# Patient Record
Sex: MALE | Race: BLACK OR AFRICAN AMERICAN | Employment: OTHER | URBAN - NONMETROPOLITAN AREA
[De-identification: names, ages, dates, MRNs, and addresses within clinical notes are randomized per-mention and may not be internally consistent; named-entity substitution may affect disease eponyms.]

---

## 2017-04-11 ENCOUNTER — HOSPITAL ENCOUNTER (EMERGENCY)
Age: 22
Discharge: HOME | End: 2017-04-11
Payer: COMMERCIAL

## 2017-04-11 VITALS
RESPIRATION RATE: 20 BRPM | TEMPERATURE: 97.4 F | BODY MASS INDEX: 16.88 KG/M2 | HEIGHT: 66 IN | HEART RATE: 89 BPM | SYSTOLIC BLOOD PRESSURE: 110 MMHG | OXYGEN SATURATION: 97 % | WEIGHT: 105 LBS | DIASTOLIC BLOOD PRESSURE: 68 MMHG

## 2017-04-11 DIAGNOSIS — T14.8XXA ABRASION: Primary | ICD-10-CM

## 2017-04-11 PROCEDURE — 99282 EMERGENCY DEPT VISIT SF MDM: CPT

## 2017-04-11 PROCEDURE — 99282 EMERGENCY DEPT VISIT SF MDM: CPT | Performed by: NURSE PRACTITIONER

## 2017-04-11 RX ORDER — TRAZODONE HYDROCHLORIDE 100 MG/1
100 TABLET ORAL NIGHTLY
COMMUNITY

## 2019-09-28 ENCOUNTER — HOSPITAL ENCOUNTER (EMERGENCY)
Age: 24
Discharge: HOME | End: 2019-09-29
Payer: COMMERCIAL

## 2019-09-28 DIAGNOSIS — R10.84 GENERALIZED ABDOMINAL PAIN: ICD-10-CM

## 2019-09-28 DIAGNOSIS — K59.00 CONSTIPATION, UNSPECIFIED CONSTIPATION TYPE: Primary | ICD-10-CM

## 2019-09-28 PROCEDURE — 99284 EMERGENCY DEPT VISIT MOD MDM: CPT

## 2019-09-29 ENCOUNTER — APPOINTMENT (OUTPATIENT)
Dept: CT IMAGING | Age: 24
End: 2019-09-29
Payer: COMMERCIAL

## 2019-09-29 ENCOUNTER — APPOINTMENT (OUTPATIENT)
Dept: GENERAL RADIOLOGY | Age: 24
End: 2019-09-29
Payer: COMMERCIAL

## 2019-09-29 VITALS
SYSTOLIC BLOOD PRESSURE: 138 MMHG | DIASTOLIC BLOOD PRESSURE: 90 MMHG | WEIGHT: 105 LBS | HEART RATE: 88 BPM | BODY MASS INDEX: 16.95 KG/M2 | TEMPERATURE: 98.1 F | RESPIRATION RATE: 20 BRPM | OXYGEN SATURATION: 100 %

## 2019-09-29 LAB
ALBUMIN SERPL-MCNC: 4.1 G/DL (ref 3.5–5.2)
ALP BLD-CCNC: 57 U/L (ref 40–130)
ALT SERPL-CCNC: 42 U/L (ref 5–41)
ANION GAP SERPL CALCULATED.3IONS-SCNC: 9 MMOL/L (ref 7–19)
AST SERPL-CCNC: 58 U/L (ref 5–40)
BASOPHILS ABSOLUTE: 0 K/UL (ref 0–0.2)
BASOPHILS RELATIVE PERCENT: 0.7 % (ref 0–1)
BILIRUB SERPL-MCNC: 0.3 MG/DL (ref 0.2–1.2)
BILIRUBIN URINE: NEGATIVE
BLOOD, URINE: NEGATIVE
BUN BLDV-MCNC: 15 MG/DL (ref 6–20)
CALCIUM SERPL-MCNC: 8.9 MG/DL (ref 8.6–10)
CHLORIDE BLD-SCNC: 97 MMOL/L (ref 98–111)
CLARITY: CLEAR
CO2: 29 MMOL/L (ref 22–29)
COLOR: YELLOW
CREAT SERPL-MCNC: 0.8 MG/DL (ref 0.5–1.2)
EOSINOPHILS ABSOLUTE: 0.1 K/UL (ref 0–0.6)
EOSINOPHILS RELATIVE PERCENT: 2 % (ref 0–5)
GFR NON-AFRICAN AMERICAN: >60
GLUCOSE BLD-MCNC: 112 MG/DL (ref 74–109)
GLUCOSE URINE: NEGATIVE MG/DL
HCT VFR BLD CALC: 44.9 % (ref 42–52)
HEMOGLOBIN: 15.1 G/DL (ref 14–18)
IMMATURE GRANULOCYTES #: 0 K/UL
KETONES, URINE: NEGATIVE MG/DL
LACTIC ACID: 1.2 MMOL/L (ref 0.5–1.9)
LEUKOCYTE ESTERASE, URINE: NEGATIVE
LIPASE: 39 U/L (ref 13–60)
LYMPHOCYTES ABSOLUTE: 1.7 K/UL (ref 1.1–4.5)
LYMPHOCYTES RELATIVE PERCENT: 31.8 % (ref 20–40)
MCH RBC QN AUTO: 32.8 PG (ref 27–31)
MCHC RBC AUTO-ENTMCNC: 33.6 G/DL (ref 33–37)
MCV RBC AUTO: 97.6 FL (ref 80–94)
MONOCYTES ABSOLUTE: 0.5 K/UL (ref 0–0.9)
MONOCYTES RELATIVE PERCENT: 9.2 % (ref 0–10)
NEUTROPHILS ABSOLUTE: 3 K/UL (ref 1.5–7.5)
NEUTROPHILS RELATIVE PERCENT: 55.9 % (ref 50–65)
NITRITE, URINE: NEGATIVE
PDW BLD-RTO: 13.5 % (ref 11.5–14.5)
PH UA: 7.5 (ref 5–8)
PLATELET # BLD: 176 K/UL (ref 130–400)
PMV BLD AUTO: 9.4 FL (ref 9.4–12.4)
PROTEIN UA: NEGATIVE MG/DL
RBC # BLD: 4.6 M/UL (ref 4.7–6.1)
SODIUM BLD-SCNC: 135 MMOL/L (ref 136–145)
SPECIFIC GRAVITY UA: 1.02 (ref 1–1.03)
TOTAL PROTEIN: 8 G/DL (ref 6.6–8.7)
URINE REFLEX TO CULTURE: NORMAL
UROBILINOGEN, URINE: 1 E.U./DL
WBC # BLD: 5.4 K/UL (ref 4.8–10.8)

## 2019-09-29 PROCEDURE — 74022 RADEX COMPL AQT ABD SERIES: CPT

## 2019-09-29 PROCEDURE — 74177 CT ABD & PELVIS W/CONTRAST: CPT

## 2019-09-29 PROCEDURE — 80053 COMPREHEN METABOLIC PANEL: CPT

## 2019-09-29 PROCEDURE — 83690 ASSAY OF LIPASE: CPT

## 2019-09-29 PROCEDURE — 36415 COLL VENOUS BLD VENIPUNCTURE: CPT

## 2019-09-29 PROCEDURE — 83605 ASSAY OF LACTIC ACID: CPT

## 2019-09-29 PROCEDURE — 6370000000 HC RX 637 (ALT 250 FOR IP): Performed by: NURSE PRACTITIONER

## 2019-09-29 PROCEDURE — 81003 URINALYSIS AUTO W/O SCOPE: CPT

## 2019-09-29 PROCEDURE — 6360000004 HC RX CONTRAST MEDICATION: Performed by: NURSE PRACTITIONER

## 2019-09-29 PROCEDURE — 85025 COMPLETE CBC W/AUTO DIFF WBC: CPT

## 2019-09-29 RX ORDER — TRAZODONE HYDROCHLORIDE 50 MG/1
100 TABLET ORAL NIGHTLY
Status: DISCONTINUED | OUTPATIENT
Start: 2019-09-29 | End: 2019-09-29 | Stop reason: HOSPADM

## 2019-09-29 RX ADMIN — TRAZODONE HYDROCHLORIDE 100 MG: 50 TABLET ORAL at 01:44

## 2019-09-29 RX ADMIN — IOPAMIDOL 50 ML: 755 INJECTION, SOLUTION INTRAVENOUS at 02:25

## 2019-09-29 SDOH — HEALTH STABILITY: MENTAL HEALTH: HOW OFTEN DO YOU HAVE A DRINK CONTAINING ALCOHOL?: NEVER

## 2019-09-29 ASSESSMENT — ENCOUNTER SYMPTOMS
DIARRHEA: 0
EYE DISCHARGE: 0
EYE REDNESS: 0
WHEEZING: 0
BLOOD IN STOOL: 0
SORE THROAT: 0
APNEA: 0
ABDOMINAL DISTENTION: 0
EYE PAIN: 0
COLOR CHANGE: 0
NAUSEA: 0
STRIDOR: 0
PHOTOPHOBIA: 0
BACK PAIN: 0
ABDOMINAL PAIN: 0
CHEST TIGHTNESS: 0
SHORTNESS OF BREATH: 0
VOMITING: 0
CONSTIPATION: 1
SINUS PAIN: 0
RECTAL PAIN: 0

## 2019-09-29 NOTE — ED TRIAGE NOTES
Pt is non-verbal and blind. Pt family at bedside. Pt parents report that pt is usual calm and happy but that for the past 3 days or so pt has been increasingly fussy. Pt mom reports that pt behavior improved following giving pt pepto which indicates to pt mom that pt is having abdominal pains.

## 2019-11-18 ENCOUNTER — NURSE TRIAGE (OUTPATIENT)
Dept: CALL CENTER | Facility: HOSPITAL | Age: 24
End: 2019-11-18

## 2019-11-18 NOTE — TELEPHONE ENCOUNTER
"Caller states son is unable to communicate and crying out like he's in pain on and off. She states she has given him a enema with no results today and she is concerned. She denies any vomiting. During assessment she states he has had a tarry stool this week. She states they have talked about getting him in with a GI doctor. Advised to be seen for evaluation.     Reason for Disposition  • Patient sounds very sick or weak to the triager    Additional Information  • Negative: [1] Abdomen pain is main symptom AND [2] adult male  • Negative: [1] Abdomen pain is main symptom AND [2] adult female  • Negative: Rectal bleeding or blood in stool is main symptom  • Negative: Rectal pain or itching is main symptom  • Negative: Constipation in a cancer patient who is currently (or recently) receiving chemotherapy or radiation therapy, or cancer patient who has metastatic or end-stage cancer and is receiving palliative care  • Negative: [1] Vomiting AND [2] abdomen looks much more swollen than usual  • Negative: [1] Vomiting AND [2] contains bile (green color)    Answer Assessment - Initial Assessment Questions  1. STOOL PATTERN OR FREQUENCY: \"How often do you pass bowel movements (BMs)?\"  (Normal range: tid to q 3 days)  \"When was the last BM passed?\"        Every couple day's   2. STRAINING: \"Do you have to strain to have a BM?\"       Some crying and belching   3. RECTAL PAIN: \"Does your rectum hurt when the stool comes out?\" If so, ask: \"Do you have hemorrhoids? How bad is the pain?\"  (Scale 1-10; or mild, moderate, severe)      Denies   4. STOOL COMPOSITION: \"Are the stools hard?\"       Have been loose   5. BLOOD ON STOOLS: \"Has there been any blood on the toilet tissue or on the surface of the BM?\" If so, ask: \"When was the last time?\"       Unsure but one did look tarry   6. CHRONIC CONSTIPATION: \"Is this a new problem for you?\"  If no, ask: How long have you had this problem?\" (days, weeks, months)        Not really but " "more so recently   7. CHANGES IN DIET: \"Have there been any recent changes in your diet?\"       Denies   8. MEDICATIONS: \"Have you been taking any new medications?\"      Only takes trazodone really   9. LAXATIVES: \"Have you been using any laxatives or enemas?\"  If yes, ask \"What, how often, and when was the last time?\"      Fleet's today and suppository two day's ago   10. CAUSE: \"What do you think is causing the constipation?\"         Lazy colon now   11. OTHER SYMPTOMS: \"Do you have any other symptoms?\" (e.g., abdominal pain, fever, vomiting)        ? Abdominal pain as he's been crying   12. PREGNANCY: \"Is there any chance you are pregnant?\" \"When was your last menstrual period?\"        n/a    Protocols used: CONSTIPATION-ADULT-      "

## 2020-02-05 ENCOUNTER — NURSE TRIAGE (OUTPATIENT)
Dept: CALL CENTER | Facility: HOSPITAL | Age: 25
End: 2020-02-05

## 2020-02-05 RX ORDER — AZITHROMYCIN 250 MG/1
250 TABLET, FILM COATED ORAL TAKE AS DIRECTED
Qty: 6 TABLET | Refills: 0 | Status: SHIPPED | OUTPATIENT
Start: 2020-02-05 | End: 2020-02-06 | Stop reason: SDUPTHER

## 2020-02-05 RX ORDER — AZITHROMYCIN 250 MG/1
250 TABLET, FILM COATED ORAL TAKE AS DIRECTED
Qty: 6 TABLET | Refills: 0 | Status: SHIPPED | OUTPATIENT
Start: 2020-02-05 | End: 2020-02-05 | Stop reason: SDUPTHER

## 2020-02-05 NOTE — TELEPHONE ENCOUNTER
Pt's mother called in requesting RX for pt.   Pt is c/o sinus infection. No fever, cough, head congestion.    Offered appt, mother stated pt is handicap and she doesn't want to get him out in this weather.     Please send RX to [facility].

## 2020-02-05 NOTE — TELEPHONE ENCOUNTER
Please send for z-pack. Advise patient's mother to have him complete antibiotics and to come to the clinic and be seen if his symptoms worsen. Thank you.

## 2020-02-05 NOTE — TELEPHONE ENCOUNTER
"Told caller zpack was sent to [facility] today at 13:55  By PILI García. Caller states will call pharmacy and hung up.     Reason for Disposition  • [1] Prescription not at pharmacy AND [2] was prescribed today by PCP    Additional Information  • Negative: Drug overdose and nurse unable to answer question  • Negative: Caller requesting information not related to medicine  • Negative: Caller requesting a prescription for Strep throat and has a positive culture result  • Negative: Rash while taking a medication or within 3 days of stopping it  • Negative: Immunization reaction suspected  • Negative: [1] Asthma and [2] having symptoms of asthma (cough, wheezing, etc)  • Negative: MORE THAN A DOUBLE DOSE of a prescription or over-the-counter (OTC) drug  • Negative: [1] DOUBLE DOSE (an extra dose or lesser amount) of over-the-counter (OTC) drug AND [2] any symptoms (e.g., dizziness, nausea, pain, sleepiness)  • Negative: [1] DOUBLE DOSE (an extra dose or lesser amount) of prescription drug AND [2] any symptoms (e.g., dizziness, nausea, pain, sleepiness)  • Negative: Took another person's prescription drug  • Negative: [1] DOUBLE DOSE (an extra dose or lesser amount) of prescription drug AND [2] NO symptoms (Exception: a double dose of antibiotics)  • Negative: Diabetes drug error or overdose (e.g., insulin or extra dose)  • Negative: [1] Request for URGENT new prescription or refill of \"essential\" medication (i.e., likelihood of harm to patient if not taken) AND [2] triager unable to fill per unit policy    Answer Assessment - Initial Assessment Questions  1. SYMPTOMS: \"Do you have any symptoms?\"      Has cough and cold  2. SEVERITY: If symptoms are present, ask \"Are they mild, moderate or severe?\"      Mild to moderate    Protocols used: MEDICATION QUESTION CALL-ADULT-      "

## 2020-02-06 RX ORDER — AZITHROMYCIN 250 MG/1
250 TABLET, FILM COATED ORAL TAKE AS DIRECTED
Qty: 6 TABLET | Refills: 0 | Status: SHIPPED | OUTPATIENT
Start: 2020-02-06 | End: 2020-02-06 | Stop reason: SDUPTHER

## 2020-02-06 RX ORDER — AZITHROMYCIN 250 MG/1
250 TABLET, FILM COATED ORAL TAKE AS DIRECTED
Qty: 6 TABLET | Refills: 0 | Status: SHIPPED | OUTPATIENT
Start: 2020-02-06 | End: 2021-03-11

## 2020-03-04 ENCOUNTER — OFFICE VISIT (OUTPATIENT)
Dept: INTERNAL MEDICINE | Facility: CLINIC | Age: 25
End: 2020-03-04

## 2020-03-04 VITALS
WEIGHT: 135 LBS | BODY MASS INDEX: 22.49 KG/M2 | DIASTOLIC BLOOD PRESSURE: 86 MMHG | HEIGHT: 65 IN | SYSTOLIC BLOOD PRESSURE: 128 MMHG | HEART RATE: 84 BPM

## 2020-03-04 DIAGNOSIS — Z00.00 ANNUAL PHYSICAL EXAM: Primary | ICD-10-CM

## 2020-03-04 DIAGNOSIS — Q90.9 DOWN SYNDROME: ICD-10-CM

## 2020-03-04 DIAGNOSIS — H54.8 LEGALLY BLIND: ICD-10-CM

## 2020-03-04 DIAGNOSIS — K59.09 CHRONIC CONSTIPATION: ICD-10-CM

## 2020-03-04 DIAGNOSIS — G47.01 INSOMNIA DUE TO MEDICAL CONDITION: ICD-10-CM

## 2020-03-04 PROBLEM — I38: Status: ACTIVE | Noted: 2020-03-04

## 2020-03-04 PROBLEM — L21.9 SEBORRHEIC DERMATITIS OF SCALP: Status: ACTIVE | Noted: 2020-03-04

## 2020-03-04 PROBLEM — R19.7 DIARRHEA: Status: ACTIVE | Noted: 2020-03-04

## 2020-03-04 PROBLEM — M41.119 JUVENILE IDIOPATHIC SCOLIOSIS: Status: ACTIVE | Noted: 2020-03-04

## 2020-03-04 PROBLEM — H33.23 DETACHED RETINA, BILATERAL: Status: ACTIVE | Noted: 2020-03-04

## 2020-03-04 PROBLEM — R32 URINARY AND BOWEL INCONTINENCE: Status: ACTIVE | Noted: 2020-03-04

## 2020-03-04 PROBLEM — Q21.23 ATRIOVENTRICULAR SEPTAL DEFECT (AVSD), COMPLETE: Status: ACTIVE | Noted: 2020-03-04

## 2020-03-04 PROBLEM — Q24.9 ADULT CONGENITAL HEART DISEASE: Status: ACTIVE | Noted: 2020-03-04

## 2020-03-04 PROBLEM — Q25.0 PATENT DUCTUS ARTERIOSUS: Status: ACTIVE | Noted: 2020-03-04

## 2020-03-04 PROBLEM — F51.04 CHRONIC INSOMNIA: Status: ACTIVE | Noted: 2020-03-04

## 2020-03-04 PROBLEM — R15.9 URINARY AND BOWEL INCONTINENCE: Status: ACTIVE | Noted: 2020-03-04

## 2020-03-04 PROBLEM — Z79.899 ENCOUNTER FOR LONG-TERM (CURRENT) DRUG USE: Status: ACTIVE | Noted: 2020-03-04

## 2020-03-04 PROBLEM — R01.1 CARDIAC MURMUR: Status: ACTIVE | Noted: 2020-03-04

## 2020-03-04 PROCEDURE — 99214 OFFICE O/P EST MOD 30 MIN: CPT | Performed by: NURSE PRACTITIONER

## 2020-03-04 RX ORDER — LINACLOTIDE 72 UG/1
72 CAPSULE, GELATIN COATED ORAL
Qty: 30 CAPSULE | Refills: 5 | Status: SHIPPED | OUTPATIENT
Start: 2020-03-04 | End: 2020-11-02 | Stop reason: SDUPTHER

## 2020-03-04 RX ORDER — KETOCONAZOLE 20 MG/ML
1 SHAMPOO TOPICAL 2 TIMES DAILY
COMMUNITY
Start: 2018-08-21 | End: 2021-03-11

## 2020-03-04 RX ORDER — LINACLOTIDE 72 UG/1
1 CAPSULE, GELATIN COATED ORAL
COMMUNITY
Start: 2020-02-22 | End: 2020-03-04 | Stop reason: SDUPTHER

## 2020-03-04 RX ORDER — PREDNISOLONE ACETATE 10 MG/ML
2 SUSPENSION/ DROPS OPHTHALMIC 3 TIMES WEEKLY
COMMUNITY
Start: 2017-06-05 | End: 2021-03-11

## 2020-03-04 RX ORDER — TRAZODONE HYDROCHLORIDE 100 MG/1
1.5 TABLET ORAL NIGHTLY PRN
COMMUNITY
Start: 2020-01-07 | End: 2020-03-04 | Stop reason: SDUPTHER

## 2020-03-04 RX ORDER — TRAZODONE HYDROCHLORIDE 100 MG/1
200 TABLET ORAL NIGHTLY
Qty: 60 TABLET | Refills: 6 | Status: SHIPPED | OUTPATIENT
Start: 2020-03-04 | End: 2020-04-24 | Stop reason: SDUPTHER

## 2020-03-04 NOTE — PROGRESS NOTES
Subjective   Kwame Dc is a 24 y.o. male.   No chief complaint on file.      Patient is here today for  yearly exam and lab review.       The following portions of the patient's history were reviewed and updated as appropriate: allergies, current medications, past family history, past medical history, past social history, past surgical history and problem list.    Review of Systems   Constitutional: Negative for activity change, appetite change, fatigue, fever, unexpected weight gain and unexpected weight loss.   HENT: Negative for swollen glands, trouble swallowing and voice change.    Eyes: Negative for blurred vision and visual disturbance.   Respiratory: Negative for cough and shortness of breath.    Cardiovascular: Negative for chest pain, palpitations and leg swelling.   Gastrointestinal: Negative for abdominal pain, constipation, diarrhea, nausea, vomiting and indigestion.   Endocrine: Negative for cold intolerance, heat intolerance, polydipsia and polyphagia.   Genitourinary: Negative for dysuria and frequency.   Musculoskeletal: Negative for arthralgias, back pain, joint swelling and neck pain.   Skin: Negative for color change, rash and skin lesions.   Neurological: Negative for dizziness, weakness, headache, memory problem and confusion.   Hematological: Does not bruise/bleed easily.   Psychiatric/Behavioral: Negative for agitation, hallucinations and suicidal ideas. The patient is not nervous/anxious.        Objective   Past Medical History:   Diagnosis Date   • Allergic    • Blind    • Down syndrome    • Heart murmur    • Urinary tract infection    • Visual impairment       Past Surgical History:   Procedure Laterality Date   • RETINAL DETACHMENT REPAIR Bilateral      multiple surgeries by Dr. Coelho        Current Outpatient Medications:   •  ketoconazole (NIZORAL) 2 % shampoo, Apply 1 application topically to the appropriate area as directed 2 (Two) Times a Day., Disp: , Rfl:   •  LINZESS 72 MCG  capsule capsule, Take 1 capsule by mouth Daily With Breakfast., Disp: 30 capsule, Rfl: 5  •  prednisoLONE acetate (PRED FORTE) 1 % ophthalmic suspension, Apply 2 drops to eye(s) as directed by provider 3 (Three) Times a Week., Disp: , Rfl:   •  traZODone (DESYREL) 100 MG tablet, Take 2 tablets by mouth Every Night., Disp: 60 tablet, Rfl: 6  •  azithromycin (ZITHROMAX Z-MIRYAM) 250 MG tablet, Take 1 tablet by mouth Take As Directed. Take 2 tablets the first day, then 1 tablet daily for 4 days., Disp: 6 tablet, Rfl: 0     Vitals:    03/04/20 0839   BP: 128/86   Pulse: 84         03/04/20  0839   Weight: 61.2 kg (135 lb)     Patient's Body mass index is 22.47 kg/m². BMI is within normal parameters. No follow-up required..      Physical Exam   Constitutional: He is oriented to person, place, and time. He appears well-developed and well-nourished.   HENT:   Head: Normocephalic and atraumatic.   Right Ear: External ear normal. cerumen impaction is present.  Left Ear: External ear normal. An impacted cerumen is present.  Mouth/Throat: Oropharynx is clear and moist.   Eyes: Pupils are equal, round, and reactive to light. Conjunctivae and EOM are normal.   Neck: Normal range of motion. Neck supple. No thyromegaly present.   Cardiovascular: Normal rate, regular rhythm, normal heart sounds and intact distal pulses.   Pulmonary/Chest: Effort normal and breath sounds normal.   Abdominal: Soft. Bowel sounds are normal.   Lymphadenopathy:     He has no cervical adenopathy.   Neurological: He is alert and oriented to person, place, and time.   Skin: Skin is warm and dry.   Psychiatric: He has a normal mood and affect. His behavior is normal. Thought content normal.   Nursing note and vitals reviewed.            Assessment/Plan   Diagnoses and all orders for this visit:    1. Annual physical exam (Primary)  -     CBC & Differential; Future  -     Comprehensive Metabolic Panel; Future  -     Lipid Panel; Future  -     TSH; Future  -      Uric Acid; Future  -     Hepatitis C Antibody; Future    2. Insomnia due to medical condition  -     traZODone (DESYREL) 100 MG tablet; Take 2 tablets by mouth Every Night.  Dispense: 60 tablet; Refill: 6    3. Chronic constipation  -     LINZESS 72 MCG capsule capsule; Take 1 capsule by mouth Daily With Breakfast.  Dispense: 30 capsule; Refill: 5    4. Down syndrome    5. Legally blind      Patient upset today related to PTSD after multiple surgeries for spontaneous retinal detachments and physical exam was limited to his participation. Patient will had labs completed next week.  Patient is non-verbal and legally blind and mother is full-time caregiver. Patient is currently using the Linzess to prevent constipation, and states that he has not had issues over the last few months. Will increase trazodone to 2 tablets at night, patients parents report he is not staying asleep more than 4-5 hours. Advised patient on the use of Sweet Oil for bilateral ear,  wax impactions, patient would not tolerate ear irrigation.

## 2020-03-11 ENCOUNTER — RESULTS ENCOUNTER (OUTPATIENT)
Dept: INTERNAL MEDICINE | Facility: CLINIC | Age: 25
End: 2020-03-11

## 2020-03-11 DIAGNOSIS — Z00.00 ANNUAL PHYSICAL EXAM: ICD-10-CM

## 2020-03-13 ENCOUNTER — TELEPHONE (OUTPATIENT)
Dept: INTERNAL MEDICINE | Facility: CLINIC | Age: 25
End: 2020-03-13

## 2020-03-13 NOTE — TELEPHONE ENCOUNTER
Pts mother called in stating that Pt needs separate scripts for depends, surgical gloves, disposable bed pads and wipes. States that Pad office sent over paper work for Map 10 and it needs the correct date put on it.    Pt Contact: Mother Mariaelena [phone]

## 2020-03-16 NOTE — TELEPHONE ENCOUNTER
PT's mother, Mariaelena, called to check the status of requested prescriptions. States scripts must be separate on the following items:  · Surgical gloves (size: XL)  · Depends (size: L)  · Disposable bed pads  · Wipes    Scripts, along with completed MAP10 form, should be faxed to PT's , Юлия, at the following provided fax number:   [phone]  ATTN: ЮЛИЯ Ferrell is requesting an update via phone: [phone]

## 2020-03-16 NOTE — TELEPHONE ENCOUNTER
I called and spoke with patient mother. Let her know we have not received this form. She will have  refax. And we will send orders for supplies asap.

## 2020-03-16 NOTE — TELEPHONE ENCOUNTER
I have not seen any paperwork for this patient.  The only thing I know to do is to write it out on a green Rx pad and have Dr. Wade sign.

## 2020-03-17 ENCOUNTER — TELEPHONE (OUTPATIENT)
Dept: INTERNAL MEDICINE | Facility: CLINIC | Age: 25
End: 2020-03-17

## 2020-03-17 NOTE — TELEPHONE ENCOUNTER
Orestes with [facility] is calling needing a Map 10 Form for the patient.  He states he received the script for the incontinence  supplies but not the Map 10.  Please fax at [phone]

## 2020-04-24 DIAGNOSIS — G47.01 INSOMNIA DUE TO MEDICAL CONDITION: ICD-10-CM

## 2020-04-24 RX ORDER — TRAZODONE HYDROCHLORIDE 100 MG/1
200 TABLET ORAL NIGHTLY
Qty: 180 TABLET | Refills: 3 | Status: SHIPPED | OUTPATIENT
Start: 2020-04-24 | End: 2021-03-29 | Stop reason: SDUPTHER

## 2020-11-02 DIAGNOSIS — K59.09 CHRONIC CONSTIPATION: ICD-10-CM

## 2020-11-02 NOTE — TELEPHONE ENCOUNTER
Caller: MELVIN NGUYEN    Relationship: Mother    Best call back number:[phone]    Medication needed:   Requested Prescriptions     Pending Prescriptions Disp Refills   • Linzess 72 MCG capsule capsule 30 capsule 5     Sig: Take 1 capsule by mouth Daily With Breakfast.       What is the patient's preferred pharmacy: [facility]/PHARMACY #[id number] - [place], [place] - [address] AT [address]CROSS FROM [address]  [phone] [facility] [phone]

## 2020-11-03 RX ORDER — LINACLOTIDE 72 UG/1
72 CAPSULE, GELATIN COATED ORAL
Qty: 30 CAPSULE | Refills: 5 | Status: SHIPPED | OUTPATIENT
Start: 2020-11-03 | End: 2021-05-06

## 2021-03-11 ENCOUNTER — OFFICE VISIT (OUTPATIENT)
Dept: INTERNAL MEDICINE | Facility: CLINIC | Age: 26
End: 2021-03-11

## 2021-03-11 VITALS
BODY MASS INDEX: 16.64 KG/M2 | OXYGEN SATURATION: 96 % | TEMPERATURE: 97.3 F | HEART RATE: 69 BPM | RESPIRATION RATE: 16 BRPM | WEIGHT: 100 LBS

## 2021-03-11 DIAGNOSIS — Z00.00 WELLNESS EXAMINATION: Primary | ICD-10-CM

## 2021-03-11 DIAGNOSIS — M41.115 JUVENILE IDIOPATHIC SCOLIOSIS OF THORACOLUMBAR REGION: ICD-10-CM

## 2021-03-11 DIAGNOSIS — R15.9 URINARY AND BOWEL INCONTINENCE: ICD-10-CM

## 2021-03-11 DIAGNOSIS — R32 URINARY AND BOWEL INCONTINENCE: ICD-10-CM

## 2021-03-11 DIAGNOSIS — Q24.9 ADULT CONGENITAL HEART DISEASE: ICD-10-CM

## 2021-03-11 DIAGNOSIS — H54.8 LEGALLY BLIND: ICD-10-CM

## 2021-03-11 DIAGNOSIS — Z11.59 NEED FOR HEPATITIS C SCREENING TEST: ICD-10-CM

## 2021-03-11 DIAGNOSIS — Q90.9 DOWN SYNDROME: ICD-10-CM

## 2021-03-11 PROCEDURE — 99395 PREV VISIT EST AGE 18-39: CPT | Performed by: INTERNAL MEDICINE

## 2021-03-11 RX ORDER — PREDNISOLONE ACETATE 10 MG/ML
1 SUSPENSION/ DROPS OPHTHALMIC 4 TIMES DAILY
COMMUNITY

## 2021-03-11 NOTE — PROGRESS NOTES
Chief Complaint   Patient presents with   • Annual Exam         History:  Kwame Dc is a 25 y.o. male who presents today for evaluation of the above problems.          ROS:  Review of Systems   Unable to perform ROS: Patient nonverbal       Allergies   Allergen Reactions   • Bacitracin Hives   • Penicillin G Hives     Past Medical History:   Diagnosis Date   • Allergic    • Blind    • Down syndrome    • Heart murmur    • Urinary tract infection    • Visual impairment      Past Surgical History:   Procedure Laterality Date   • RETINAL DETACHMENT REPAIR Bilateral      multiple surgeries by Dr. Coelho     Family History   Problem Relation Age of Onset   • No Known Problems Mother    • No Known Problems Father    • No Known Problems Maternal Grandmother    • No Known Problems Maternal Grandfather    • No Known Problems Paternal Grandmother    • No Known Problems Paternal Grandfather      The patient  reports that he has never smoked. He has never used smokeless tobacco. He reports that he does not drink alcohol and does not use drugs.  Immunization History   Administered Date(s) Administered   • Flu Vaccine Intradermal Quad 18-64YR 09/21/2020          Current Outpatient Medications:   •  Linzess 72 MCG capsule capsule, Take 1 capsule by mouth Daily With Breakfast., Disp: 30 capsule, Rfl: 5  •  prednisoLONE acetate (PRED FORTE) 1 % ophthalmic suspension, 1 drop 4 (Four) Times a Day., Disp: , Rfl:   •  traZODone (DESYREL) 100 MG tablet, Take 2 tablets by mouth Every Night., Disp: 180 tablet, Rfl: 3    OBJECTIVE:  Pulse 69   Temp 97.3 °F (36.3 °C)   Resp 16   Wt 45.4 kg (100 lb)   SpO2 96%   BMI 16.64 kg/m²    Physical Exam  Constitutional:       Comments: Patient is nonverbal he spent most of the examination time slapping himself in the head crying out.  Patient appeared not to have any discomfort or pain just a behavioral disorder associated with blindness Down syndrome.   HENT:      Right Ear: There is impacted  cerumen.      Left Ear: There is impacted cerumen.      Nose: Nose normal.   Cardiovascular:      Rate and Rhythm: Normal rate and regular rhythm.   Abdominal:      Palpations: Abdomen is soft.   Musculoskeletal:      Cervical back: Neck supple.   Skin:     Comments: Multiple areas of nodularity on his posterior scalp upper neck.  Apparently these occurred after a shaving few years ago.  These appear to be excessive scar tissue   Neurological:      Comments: Patient cried out throughout the examination he is moving both upper extremities slapping himself in the head.  This is apparently common behavior per his mother         The patient was counseled regarding healthy weight, immunizations and screenings.  Health Maintenance:        Assessment/Plan     Diagnoses and all orders for this visit:    1. Wellness examination (Primary)  -     CBC & Differential  -     Comprehensive Metabolic Panel  -     Urinalysis With Microscopic - Urine, Clean Catch  -     TSH  -     Lipid Panel  -     Uric Acid    2. Need for hepatitis C screening test  -     Hepatitis C Antibody    3. Adult congenital heart disease    4. Urinary and bowel incontinence    5. Juvenile idiopathic scoliosis of thoracolumbar region    6. Legally blind    7. Down syndrome      Patient is to have lab work today unable to get a good blood pressure on him due to flailing of his arms.  This patient is listed as having Down syndrome certainly would appear that he has some degree of autism which apparently he has a brother with autism and that would certainly fit the behavioral abnormality which is not typical of Down syndrome.  This was a very difficult examination due to his constant screaming and yelling and flailing of his arms.  I did a limited heart exam but is very difficult I think he does have a 1/6 to 2/6 systolic murmur however I did not list that and his physical exam is once again this was quite difficult.     An After Visit Summary was printed and  given to the patient at discharge.  Return in about 6 months (around 9/11/2021), or with NP, for Recheck.         Terry Wade MD  3/11/2021   Electronically signed.

## 2021-03-12 LAB
ALBUMIN SERPL-MCNC: 3.9 G/DL (ref 3.5–5.2)
ALBUMIN/GLOB SERPL: 1.1 G/DL
ALP SERPL-CCNC: 71 U/L (ref 39–117)
ALT SERPL-CCNC: 19 U/L (ref 1–41)
AST SERPL-CCNC: 23 U/L (ref 1–40)
BASOPHILS # BLD AUTO: 0.04 10*3/MM3 (ref 0–0.2)
BASOPHILS NFR BLD AUTO: 0.5 % (ref 0–1.5)
BILIRUB SERPL-MCNC: 0.2 MG/DL (ref 0–1.2)
BUN SERPL-MCNC: 13 MG/DL (ref 6–20)
BUN/CREAT SERPL: 16.9 (ref 7–25)
CALCIUM SERPL-MCNC: 8.8 MG/DL (ref 8.6–10.5)
CHLORIDE SERPL-SCNC: 97 MMOL/L (ref 98–107)
CHOLEST SERPL-MCNC: 155 MG/DL (ref 0–200)
CO2 SERPL-SCNC: 31.6 MMOL/L (ref 22–29)
CREAT SERPL-MCNC: 0.77 MG/DL (ref 0.76–1.27)
EOSINOPHIL # BLD AUTO: 0.17 10*3/MM3 (ref 0–0.4)
EOSINOPHIL NFR BLD AUTO: 2.3 % (ref 0.3–6.2)
ERYTHROCYTE [DISTWIDTH] IN BLOOD BY AUTOMATED COUNT: 13.4 % (ref 12.3–15.4)
GLOBULIN SER CALC-MCNC: 3.5 GM/DL
GLUCOSE SERPL-MCNC: 54 MG/DL (ref 65–99)
HCT VFR BLD AUTO: 44.9 % (ref 37.5–51)
HCV AB S/CO SERPL IA: 0.4 S/CO RATIO (ref 0–0.9)
HDLC SERPL-MCNC: 31 MG/DL (ref 40–60)
HGB BLD-MCNC: 15.7 G/DL (ref 13–17.7)
IMM GRANULOCYTES # BLD AUTO: 0.03 10*3/MM3 (ref 0–0.05)
IMM GRANULOCYTES NFR BLD AUTO: 0.4 % (ref 0–0.5)
LDLC SERPL CALC-MCNC: 90 MG/DL (ref 0–100)
LYMPHOCYTES # BLD AUTO: 1.28 10*3/MM3 (ref 0.7–3.1)
LYMPHOCYTES NFR BLD AUTO: 17.5 % (ref 19.6–45.3)
MCH RBC QN AUTO: 34.3 PG (ref 26.6–33)
MCHC RBC AUTO-ENTMCNC: 35 G/DL (ref 31.5–35.7)
MCV RBC AUTO: 98 FL (ref 79–97)
MONOCYTES # BLD AUTO: 0.46 10*3/MM3 (ref 0.1–0.9)
MONOCYTES NFR BLD AUTO: 6.3 % (ref 5–12)
NEUTROPHILS # BLD AUTO: 5.32 10*3/MM3 (ref 1.7–7)
NEUTROPHILS NFR BLD AUTO: 73 % (ref 42.7–76)
NRBC BLD AUTO-RTO: 0 /100 WBC (ref 0–0.2)
PLATELET # BLD AUTO: 193 10*3/MM3 (ref 140–450)
POTASSIUM SERPL-SCNC: 3.8 MMOL/L (ref 3.5–5.2)
PROT SERPL-MCNC: 7.4 G/DL (ref 6–8.5)
RBC # BLD AUTO: 4.58 10*6/MM3 (ref 4.14–5.8)
SODIUM SERPL-SCNC: 137 MMOL/L (ref 136–145)
TRIGL SERPL-MCNC: 196 MG/DL (ref 0–150)
TSH SERPL DL<=0.005 MIU/L-ACNC: 2.64 UIU/ML (ref 0.27–4.2)
URATE SERPL-MCNC: 5.3 MG/DL (ref 3.4–7)
VLDLC SERPL CALC-MCNC: 34 MG/DL (ref 5–40)
WBC # BLD AUTO: 7.3 10*3/MM3 (ref 3.4–10.8)

## 2021-03-16 ENCOUNTER — TELEPHONE (OUTPATIENT)
Dept: INTERNAL MEDICINE | Facility: CLINIC | Age: 26
End: 2021-03-16

## 2021-03-16 NOTE — TELEPHONE ENCOUNTER
----- Message from Terry Wade MD sent at 3/15/2021  7:53 AM CDT -----  Results were normal with the exception of hypoglycemia.  Make sure he is getting adequate protein in his diet.  This can be worked up however probably the best thing to do is just to recheck his sugar with a random sugar at patient's convenience if it is persistently low he will need imaging of his abdomen however due to his current condition he would likely require anesthesia to sedate him for any kind of imaging.   Clinic Care Coordination Contact  [facility]/Voicemail       Clinical Data: Care Coordinator Outreach  Outreach attempted .  Left message on voicemail with call back information and requested return call.  Plan:  Care Coordinator will try to reach patient again next month if no return call from patient.

## 2021-03-16 NOTE — TELEPHONE ENCOUNTER
03/11/2021 labs    Spoke with pt mother/guardian gave results/instructions, she voiced understanding

## 2021-03-29 DIAGNOSIS — G47.01 INSOMNIA DUE TO MEDICAL CONDITION: ICD-10-CM

## 2021-03-29 RX ORDER — TRAZODONE HYDROCHLORIDE 100 MG/1
200 TABLET ORAL NIGHTLY
Qty: 180 TABLET | Refills: 3 | Status: SHIPPED | OUTPATIENT
Start: 2021-03-29 | End: 2021-05-27 | Stop reason: SDUPTHER

## 2021-03-29 NOTE — TELEPHONE ENCOUNTER
Caller: Mariaelena Dc    Relationship: Guardian    Best call back number:[phone]    Medication needed:   Requested Prescriptions     Pending Prescriptions Disp Refills   • traZODone (DESYREL) 100 MG tablet 180 tablet 3     Sig: Take 2 tablets by mouth Every Night.       When do you need the refill by:3/29/21    What additional details did the patient provide when requesting the medication:     Does the patient have less than a 3 day supply:  [x] Yes  [] No    What is the patient's preferred pharmacy: [facility]/PHARMACY #[id number] - [place] - [address] AT [address]CROSS FROM [address]  [phone] [facility] [phone] FX

## 2021-05-05 DIAGNOSIS — K59.09 CHRONIC CONSTIPATION: ICD-10-CM

## 2021-05-06 RX ORDER — LINACLOTIDE 72 UG/1
72 CAPSULE, GELATIN COATED ORAL
Qty: 30 CAPSULE | Refills: 5 | Status: SHIPPED | OUTPATIENT
Start: 2021-05-06

## 2021-05-27 ENCOUNTER — TELEPHONE (OUTPATIENT)
Dept: INTERNAL MEDICINE | Facility: CLINIC | Age: 26
End: 2021-05-27

## 2021-05-27 DIAGNOSIS — G47.01 INSOMNIA DUE TO MEDICAL CONDITION: ICD-10-CM

## 2021-05-27 DIAGNOSIS — Q90.9 DOWN SYNDROME: Primary | ICD-10-CM

## 2021-05-27 RX ORDER — TRAZODONE HYDROCHLORIDE 100 MG/1
300 TABLET ORAL NIGHTLY
Qty: 270 TABLET | Refills: 3 | Status: SHIPPED | OUTPATIENT
Start: 2021-05-27 | End: 2022-03-16 | Stop reason: SDUPTHER

## 2021-05-27 RX ORDER — TRAZODONE HYDROCHLORIDE 100 MG/1
200 TABLET ORAL NIGHTLY
Qty: 180 TABLET | Refills: 3 | Status: CANCELLED | OUTPATIENT
Start: 2021-05-27

## 2021-05-27 NOTE — TELEPHONE ENCOUNTER
PATIENTS MOTHER CALLED IN REQUESTING WE REFAX THE PRESCRIPTION FOR HIS SUPPLIES   THE PAD OFFICE IS TELLING MOTHER THAT IS WAS NOT DATED AND THEY NEED A  NEW PRESCRIPTION SENT    PLEASE FAX TO   [phone]      GOOD CALL BACK   [phone]

## 2021-05-27 NOTE — TELEPHONE ENCOUNTER
Caller: Kwame Dc    Relationship: Self    Best call back number: [phone]  Medication needed:   Requested Prescriptions     Pending Prescriptions Disp Refills   • traZODone (DESYREL) 100 MG tablet 180 tablet 3     Sig: Take 2 tablets by mouth Every Night.       When do you need the refill by: ASAP    What additional details did the patient provide when requesting the medication: IS ON 3 TABLETS NIGHTLY NEEDING A SCRIPT TO BE CHANGED TO THAT IF POSSIBLE STATES DOCTOR VERBAL SAID IT WAS OKAY BUT DID NOT CHANGE THE SCRIPT   Does the patient have less than a 3 day supply:  [x] Yes  [] No    What is the patient's preferred pharmacy: [facility]/PHARMACY #[id number] - [place], [place] - [address] AT [address]OSS FROM [address]  [phone] [facility] [phone] FX

## 2021-05-27 NOTE — TELEPHONE ENCOUNTER
Okay for them to continue as previously taken.  Some of his been taking 300 mg at night they can continue that

## 2021-05-27 NOTE — TELEPHONE ENCOUNTER
Mother called asking for a refill of traZODone (DESYREL) 100 MG tablet. We have it listed in chart as 2 nightly , but mother is stating he was changed to 3, I could not find any documentation of this.   Please advise

## 2021-09-17 ENCOUNTER — TELEPHONE (OUTPATIENT)
Dept: INTERNAL MEDICINE | Facility: CLINIC | Age: 26
End: 2021-09-17

## 2021-09-17 NOTE — TELEPHONE ENCOUNTER
Pt mother did not want to pay $60 for a phone visit to get a covid test.  His sister went to the pharmacy so they will also

## 2021-09-17 NOTE — TELEPHONE ENCOUNTER
Caller: Mariaelena Dc    Relationship to patient: Mother    Best call back number: [phone]      Patient is needing: Mariaelena states that they were exposed to covid on approx 9/15/21. She is asking for a test to be ordered.      Symptoms include: Fever, chills, cough

## 2021-09-20 ENCOUNTER — OFFICE VISIT (OUTPATIENT)
Dept: INTERNAL MEDICINE | Facility: CLINIC | Age: 26
End: 2021-09-20

## 2021-09-20 ENCOUNTER — CLINICAL SUPPORT (OUTPATIENT)
Dept: INTERNAL MEDICINE | Facility: CLINIC | Age: 26
End: 2021-09-20

## 2021-09-20 DIAGNOSIS — R05.9 COUGH: ICD-10-CM

## 2021-09-20 DIAGNOSIS — Z20.822 EXPOSURE TO COVID-19 VIRUS: Primary | ICD-10-CM

## 2021-09-20 DIAGNOSIS — J06.9 ACUTE URI: Primary | ICD-10-CM

## 2021-09-20 PROCEDURE — 99211 OFF/OP EST MAY X REQ PHY/QHP: CPT | Performed by: NURSE PRACTITIONER

## 2021-09-20 PROCEDURE — 99442 PR PHYS/QHP TELEPHONE EVALUATION 11-20 MIN: CPT | Performed by: NURSE PRACTITIONER

## 2021-09-20 NOTE — PROGRESS NOTES
Kwame Dc  1995  [id number]    Verified patient's NAME and  before test was performed.       COVID-19 Test Performed:   Oropharyngeal Swab     Additional Tests Performed:   N/A    Location:  PATIENT VEHICLE - [facility]    How did the patient tolerate the test?   Poorly tolerated by patient., Physician notified.    Staff Member Performing Test:  Jhoana Senior    PPE Worn:    mask, gloves, eye protection, face shield and gown          Patient presented for COVID-19 testing as ordered by the provider. Appropriate PPE donned. Patient tolerated well. Patient was given COVID-19 Fact Sheet, How to Quarantine at Home Instructions, and Infection Prevention in the Home handout. Patient advised that results are expected within 2-4 days depending on the time of test.     While waiting for results of test, patient was asked to please call their primary care physician's office or the [place] hotline at [phone] for support of non-emergent symptoms expected with this virus such as increased shortness of breath, fever, cough, or other questions.    He was advised to seek care in the Emergency Department should extreme symptoms arise such as new onset confusion, extreme lethargy, hypoxia, or new hypotension.     Questions were engaged and answered to the best of my ability, patient expressed verbal understanding.

## 2021-09-20 NOTE — PROGRESS NOTES
Subjective   Kwame Dc is a 26 y.o. male.   Chief Complaint   Patient presents with   • Exposure To Known Illness     Nephew COVID Positive, symptoms x 3 days, dry cough and headache       You have chosen to receive care through a telephone visit. Do you consent to use a telephone visit for your medical care today? Yes    Kwame his mother and father present via telephone visit today on behalf of of the patient.  They would like to discuss exposure to COVID-19 and the development of symptoms for the patient.  They report he started coughing over the weekend about 3 or 4 days ago.  He has been hitting his head more as though his head is hurting.  He does not appear to be in any distress or having shortness of breath, wheezing or chills.  There has been no fever.  He has been vaccinated against COVID-19.  There has been exposure to a Covid positive case.       The following portions of the patient's history were reviewed and updated as appropriate: allergies, current medications, past family history, past medical history, past social history, past surgical history and problem list.    Review of Systems   Constitutional: Negative for activity change, appetite change, fatigue, fever, unexpected weight gain and unexpected weight loss.   HENT: Negative for swollen glands, trouble swallowing and voice change.    Eyes: Negative for blurred vision and visual disturbance.   Respiratory: Positive for cough. Negative for shortness of breath.    Cardiovascular: Negative for chest pain, palpitations and leg swelling.   Gastrointestinal: Negative for abdominal pain, constipation, diarrhea, nausea, vomiting and indigestion.   Endocrine: Negative for cold intolerance, heat intolerance, polydipsia and polyphagia.   Genitourinary: Negative for dysuria and frequency.   Musculoskeletal: Negative for arthralgias, back pain, joint swelling and neck pain.   Skin: Negative for color change, rash and skin lesions.   Neurological: Positive  for headache. Negative for dizziness, weakness, memory problem and confusion.   Hematological: Does not bruise/bleed easily.   Psychiatric/Behavioral: Negative for agitation, hallucinations and suicidal ideas. The patient is not nervous/anxious.        Objective   Past Medical History:   Diagnosis Date   • Allergic    • Blind    • Down syndrome    • Heart murmur    • Urinary tract infection    • Visual impairment       Past Surgical History:   Procedure Laterality Date   • RETINAL DETACHMENT REPAIR Bilateral      multiple surgeries by Dr. Coelho        Current Outpatient Medications:   •  Linzess 72 MCG capsule capsule, TAKE 1 CAPSULE BY MOUTH DAILY WITH BREAKFAST., Disp: 30 capsule, Rfl: 5  •  prednisoLONE acetate (PRED FORTE) 1 % ophthalmic suspension, 1 drop 4 (Four) Times a Day., Disp: , Rfl:   •  traZODone (DESYREL) 100 MG tablet, Take 3 tablets by mouth Every Night., Disp: 270 tablet, Rfl: 3      There were no vitals filed for this visit.  There were no vitals filed for this visit.    There is no height or weight on file to calculate BMI.    Physical Exam    No physical exam due to telephone visit      Assessment/Plan   Diagnoses and all orders for this visit:    1. Exposure to COVID-19 virus (Primary)    2. Cough      I have advised at this time to come for COVID-19 testing this afternoon.  They have agreed to do this and will arrive between 3 and 4 this afternoon.  At this time symptoms appear mild though with his nonverbal status I have advised they need to monitor closely for worsening symptoms or signs of shortness of breath.  Need to continue monitoring for fever.  Advised increasing water intake.  If symptoms seem to progress or start to worsen will need to contact the office or go to the emergency room for further evaluation.  Kwame's mother and father also had symptoms that are mild and being tested for COVID-19 as well.    This visit has been rescheduled as a phone visit to comply with patient safety  concerns in accordance with CDC recommendations. Total time of discussion was 11-20 minutes.

## 2021-09-21 LAB
LABCORP SARS-COV-2, NAA 2 DAY TAT: NORMAL
SARS-COV-2 RNA RESP QL NAA+PROBE: NOT DETECTED

## 2021-09-22 ENCOUNTER — TELEPHONE (OUTPATIENT)
Dept: INTERNAL MEDICINE | Facility: CLINIC | Age: 26
End: 2021-09-22

## 2021-09-22 NOTE — TELEPHONE ENCOUNTER
----- Message from PILI Vásquez sent at 9/22/2021  6:22 AM CDT -----  Negative for COVID-19.  With the significant exposure, I would still act as though he is COVID-19 positive and continue quarantine for a minimum of 10 days from symptom onset.

## 2022-03-16 ENCOUNTER — OFFICE VISIT (OUTPATIENT)
Dept: INTERNAL MEDICINE | Facility: CLINIC | Age: 27
End: 2022-03-16

## 2022-03-16 VITALS
TEMPERATURE: 97.1 F | BODY MASS INDEX: 21.16 KG/M2 | DIASTOLIC BLOOD PRESSURE: 70 MMHG | SYSTOLIC BLOOD PRESSURE: 120 MMHG | WEIGHT: 115 LBS | HEIGHT: 62 IN | HEART RATE: 79 BPM

## 2022-03-16 DIAGNOSIS — M41.115 JUVENILE IDIOPATHIC SCOLIOSIS OF THORACOLUMBAR REGION: ICD-10-CM

## 2022-03-16 DIAGNOSIS — G47.01 INSOMNIA DUE TO MEDICAL CONDITION: ICD-10-CM

## 2022-03-16 DIAGNOSIS — H54.8 LEGALLY BLIND: ICD-10-CM

## 2022-03-16 DIAGNOSIS — Z00.00 WELLNESS EXAMINATION: Primary | ICD-10-CM

## 2022-03-16 DIAGNOSIS — K59.09 CHRONIC CONSTIPATION: ICD-10-CM

## 2022-03-16 DIAGNOSIS — R15.9 URINARY AND BOWEL INCONTINENCE: ICD-10-CM

## 2022-03-16 DIAGNOSIS — R32 URINARY AND BOWEL INCONTINENCE: ICD-10-CM

## 2022-03-16 DIAGNOSIS — Q90.9 DOWN SYNDROME: ICD-10-CM

## 2022-03-16 PROCEDURE — 99395 PREV VISIT EST AGE 18-39: CPT | Performed by: NURSE PRACTITIONER

## 2022-03-16 RX ORDER — TRAZODONE HYDROCHLORIDE 100 MG/1
300 TABLET ORAL NIGHTLY
Qty: 270 TABLET | Refills: 3 | Status: SHIPPED | OUTPATIENT
Start: 2022-03-16 | End: 2023-02-28 | Stop reason: SDUPTHER

## 2022-03-16 RX ORDER — FACIAL-BODY WIPES
1 EACH TOPICAL 4 TIMES DAILY
Qty: 1 EACH | Refills: 12 | Status: SHIPPED | OUTPATIENT
Start: 2022-03-16 | End: 2022-03-16

## 2022-03-16 NOTE — PROGRESS NOTES
Subjective   Kwame Dc is a 26 y.o. male.   Chief Complaint   Patient presents with   • Annual Exam     Has been doing pretty good, had a persistent cough but this is better now, eating good, bm's good, needs ppwk filled out and scripts for supplies for the [facility] office in [place]   • Med Refill     Needs new script trazodone to be prescribed 4 times daily       Kwame is here for annual physical exam.  He does have some dry wax in his ear and his mother has been applying some sweet oil a few times a week.  This help the wax come out easier.  She states he has started to have coughing at night but states he has not done this recently.  Otherwise he is doing well and in good spirits.  Linzess is helpful with bowel movements.         The following portions of the patient's history were reviewed and updated as appropriate: allergies, current medications, past family history, past medical history, past social history, past surgical history and problem list.    Review of Systems   Constitutional: Negative for activity change, appetite change, fatigue, fever, unexpected weight gain and unexpected weight loss.   HENT: Negative for swollen glands, trouble swallowing and voice change.    Eyes: Negative for blurred vision and visual disturbance.   Respiratory: Negative for cough and shortness of breath.    Cardiovascular: Negative for chest pain, palpitations and leg swelling.   Gastrointestinal: Negative for abdominal pain, constipation, diarrhea, nausea, vomiting and indigestion.   Endocrine: Negative for cold intolerance, heat intolerance, polydipsia and polyphagia.   Genitourinary: Negative for dysuria and frequency.   Musculoskeletal: Negative for arthralgias, back pain, joint swelling and neck pain.   Skin: Negative for color change, rash and skin lesions.   Neurological: Negative for dizziness, weakness, headache, memory problem and confusion.   Hematological: Does not bruise/bleed easily.   Psychiatric/Behavioral:  Negative for agitation, hallucinations and suicidal ideas. The patient is not nervous/anxious.        Objective   Past Medical History:   Diagnosis Date   • Allergic    • Blind    • Down syndrome    • Heart murmur    • Urinary tract infection    • Visual impairment       Past Surgical History:   Procedure Laterality Date   • RETINAL DETACHMENT REPAIR Bilateral      multiple surgeries by Dr. Coelho        Current Outpatient Medications:   •  Linzess 72 MCG capsule capsule, TAKE 1 CAPSULE BY MOUTH DAILY WITH BREAKFAST., Disp: 30 capsule, Rfl: 5  •  prednisoLONE acetate (PRED FORTE) 1 % ophthalmic suspension, 1 drop 4 (Four) Times a Day., Disp: , Rfl:   •  traZODone (DESYREL) 100 MG tablet, Take 3 tablets by mouth Every Night., Disp: 270 tablet, Rfl: 3      Vitals:    03/16/22 0908   BP: 120/70   Pulse: 79   Temp: 97.1 °F (36.2 °C)         03/16/22  0908   Weight: 52.2 kg (115 lb)       Body mass index is 21.03 kg/m².    Physical Exam  Constitutional:       Appearance: He is well-developed.      Comments: Limitations on exam due to mental disability; patient fighting and having to be held down by mother.    HENT:      Head: Normocephalic.      Right Ear: Tympanic membrane and external ear normal. There is impacted cerumen.      Left Ear: Tympanic membrane and external ear normal. There is impacted cerumen.      Ears:      Comments: Leg     Nose: Nose normal.      Mouth/Throat:      Mouth: Mucous membranes are moist. No oral lesions.      Pharynx: Oropharynx is clear.   Eyes:      Conjunctiva/sclera: Conjunctivae normal.      Pupils: Pupils are equal, round, and reactive to light.      Comments: Legally blind   Neck:      Thyroid: No thyromegaly.      Vascular: No carotid bruit.   Cardiovascular:      Rate and Rhythm: Normal rate and regular rhythm.      Pulses: Normal pulses.           Radial pulses are 2+ on the right side and 2+ on the left side.      Heart sounds: Murmur heard.    Systolic murmur is present with a  grade of 2/6.  Pulmonary:      Effort: Pulmonary effort is normal.      Breath sounds: Normal breath sounds.   Abdominal:      General: Bowel sounds are normal.      Palpations: Abdomen is soft.      Tenderness: There is no abdominal tenderness.   Musculoskeletal:      Cervical back: Normal range of motion.      Right lower leg: No edema.      Left lower leg: No edema.      Comments: No swelling to bilaterally lower extremities; feet turned inward.    Skin:     General: Skin is warm and dry.   Neurological:      Mental Status: He is alert and oriented to person, place, and time.      Gait: Gait normal.   Psychiatric:         Mood and Affect: Mood normal.         Speech: Speech normal.         Behavior: Behavior normal.         Thought Content: Thought content normal.               Assessment/Plan   Diagnoses and all orders for this visit:    1. Wellness examination (Primary)  -     Comprehensive Metabolic Panel  -     CBC & Differential  -     Lipid Panel  -     Uric Acid  -     TSH  -     Urinalysis With Microscopic - Urine, Clean Catch    2. Down syndrome    3. Chronic constipation    4. Urinary and bowel incontinence    5. Juvenile idiopathic scoliosis of thoracolumbar region    6. Legally blind    7. Insomnia due to medical condition  -     traZODone (DESYREL) 100 MG tablet; Take 3 tablets by mouth Every Night.  Dispense: 270 tablet; Refill: 3      Will obtain yearly labs today.   Heart murmur is monitored by April per mother's report.    Linzess working well for constipation.    Requesting increase in Trazodone to 400mg at bedtime.  This is a very large dose.  She states it doesn't seem to be helping like it use to.  At this time I've suggesting trying a 3mg Melatonin at bedtime, and can increase to 5mg as needed.

## 2022-05-05 ENCOUNTER — TELEPHONE (OUTPATIENT)
Dept: INTERNAL MEDICINE | Facility: CLINIC | Age: 27
End: 2022-05-05

## 2022-05-05 NOTE — TELEPHONE ENCOUNTER
PATIENT GUARDIAN CALLED TO CHECK ON STATUS OF MEDICATION REQUEST. WANTED TO KNOW IF RX COULD BE CALLED TO PHARMACY OR IF SHE WOULD NEED TO TAKE PATIENT TO ER.       PLEASE CONTACT AND ADVISE @ [phone].     PHARMACY:  [id number] [place] - [address] AT [address]OSS FROM [address] - [phone]  - [phone]   [phone]

## 2022-05-05 NOTE — TELEPHONE ENCOUNTER
Caller: MELVIN NGUYEN    Relationship: GUARDIAN    Best call back number: [phone]      Who are you requesting to speak with     CLINICAL STAFF      Do you know the name of the person who called: MELVIN    What was the call regarding:     PATIENT HAS AN APPOINTMENT FOR TOMORROW BUT WAS HOPING TO GET SOME MEDICATION TONIGHT. PATIENT IS A HEART PATIENT. UPPER RESPIRATORY ISSUES, YELLOW SNOTTY NOSE, CONGESTION, COUGH      Do you require a callback: YES, PLEASE ADVISE    [id number] - [place], [place] - [address] AT [address] FROM [address]  [phone] [facility] [phone] FX

## 2022-05-06 ENCOUNTER — OFFICE VISIT (OUTPATIENT)
Dept: INTERNAL MEDICINE | Facility: CLINIC | Age: 27
End: 2022-05-06

## 2022-05-06 VITALS
HEIGHT: 62 IN | SYSTOLIC BLOOD PRESSURE: 112 MMHG | BODY MASS INDEX: 21.16 KG/M2 | WEIGHT: 115 LBS | DIASTOLIC BLOOD PRESSURE: 82 MMHG | TEMPERATURE: 97.3 F

## 2022-05-06 DIAGNOSIS — J06.9 UPPER RESPIRATORY TRACT INFECTION, UNSPECIFIED TYPE: Primary | ICD-10-CM

## 2022-05-06 DIAGNOSIS — L40.9 SCALP PSORIASIS: ICD-10-CM

## 2022-05-06 LAB
EXPIRATION DATE: 0
EXPIRATION DATE: 0
FLUAV AG NPH QL: NEGATIVE
FLUBV AG NPH QL: NEGATIVE
INTERNAL CONTROL: NORMAL
INTERNAL CONTROL: NORMAL
Lab: 0
Lab: 0
S PYO AG THROAT QL: NEGATIVE

## 2022-05-06 PROCEDURE — 87804 INFLUENZA ASSAY W/OPTIC: CPT

## 2022-05-06 PROCEDURE — 99214 OFFICE O/P EST MOD 30 MIN: CPT

## 2022-05-06 PROCEDURE — 87880 STREP A ASSAY W/OPTIC: CPT

## 2022-05-06 RX ORDER — FLUOCINOLONE ACETONIDE 0.11 MG/ML
1 OIL TOPICAL DAILY
Qty: 30 ML | Refills: 0 | Status: SHIPPED | OUTPATIENT
Start: 2022-05-06

## 2022-05-06 RX ORDER — AZITHROMYCIN 250 MG/1
TABLET, FILM COATED ORAL
Qty: 6 TABLET | Refills: 0 | Status: SHIPPED | OUTPATIENT
Start: 2022-05-06 | End: 2023-02-08

## 2022-05-06 RX ORDER — GUAIFENESIN 200 MG/10ML
400 LIQUID ORAL 3 TIMES DAILY
Qty: 1000 ML | Refills: 0 | Status: SHIPPED | OUTPATIENT
Start: 2022-05-06 | End: 2023-02-08

## 2022-05-06 NOTE — PATIENT INSTRUCTIONS
Scalp oil: Apply a thin film onto scalp and massage thoroughly into wet or dampened hair/scalp; cover with shower cap. Leave on overnight (or for at least 4 hours). Remove by washing hair with shampoo and rinsing thoroughly      Please don't hesitate to call and be seen if no improvement over the weekend

## 2022-05-06 NOTE — PROGRESS NOTES
"Subjective   Kwame Dc is a 26 y.o. male.   Chief Complaint   Patient presents with   • URI     Fever, congestion, coughing up thick yellow phlegm, drainage, everyone at home has had sinus infection, has alternated Tylenol/Motrin all night trying to break fever, had applesauce last night, sore throat       History of Present Illness   Mr. Dc is here today with complaints of fever, productive cough with thick yellow phlegm, sore throat, and copious nasal drainage.   He is here with his mother Carmelita who is the source of all this information as Kwame is nonverbal.  She reports that symptoms came on sudden, yesterday at 1600, her thermometer is not working correctly but she could tell he was running a fever because he was very hot to the touch.  She has been alternating tylenol and motrin and also gave him a cool shower. She has noted that he has had some difficulty swallowing related to sore throat. She has been trying to keep him well hydrated. He has also had some applesauce today. She has not noted wheezing, nor any signs of shortness of breath. She thinks he's acting a little better now.   She is asking about a medicine her daughter mentioned that might be helpful, a rocephin shot. HE has never had one before.  Mother reports that several family members have been sick as well.   She also asks if there is any other treatment that can be tried for his scalp, he has dry plaques that come and go, previously tried some \"tar\" treatment and selsun blue shampoo, this did not work.       The following portions of the patient's history were reviewed and updated as appropriate: allergies, current medications, past family history, past medical history, past social history, past surgical history and problem list.    Review of Systems    Objective   Past Medical History:   Diagnosis Date   • Allergic    • Blind    • Down syndrome    • Heart murmur    • Urinary tract infection    • Visual impairment       Past Surgical " "History:   Procedure Laterality Date   • RETINAL DETACHMENT REPAIR Bilateral      multiple surgeries by Dr. Coelho        Current Outpatient Medications:   •  Linzess 72 MCG capsule capsule, TAKE 1 CAPSULE BY MOUTH DAILY WITH BREAKFAST., Disp: 30 capsule, Rfl: 5  •  prednisoLONE acetate (PRED FORTE) 1 % ophthalmic suspension, 1 drop 4 (Four) Times a Day., Disp: , Rfl:   •  traZODone (DESYREL) 100 MG tablet, Take 3 tablets by mouth Every Night., Disp: 270 tablet, Rfl: 3  •  azithromycin (Zithromax Z-Andrés) 250 MG tablet, Take 2 tablets the first day, then 1 tablet daily for 4 days., Disp: 6 tablet, Rfl: 0  •  Fluocinolone Acetonide Scalp 0.01 % oil, Apply 1 each topically Daily., Disp: 30 mL, Rfl: 0  •  guaifenesin (ROBITUSSIN) 100 MG/5ML liquid, Take 20 mL by mouth 3 (Three) Times a Day., Disp: 1000 mL, Rfl: 0      /82 (BP Location: Left arm, Patient Position: Sitting, Cuff Size: Adult)   Temp 97.3 °F (36.3 °C) (Temporal)   Ht 157.5 cm (62\")   Wt 52.2 kg (115 lb)   BMI 21.03 kg/m²      Body mass index is 21.03 kg/m².  BMI is within normal parameters. No follow-up required.       Physical Exam  Vitals and nursing note reviewed.   Constitutional:       General: He is not in acute distress.     Appearance: He is ill-appearing (mucus drainage from nares, purulent mucus from cough, congested loose cough). He is not toxic-appearing or diaphoretic.   HENT:      Right Ear: There is impacted cerumen.      Left Ear: There is impacted cerumen.      Nose: Congestion and rhinorrhea present.      Mouth/Throat:      Mouth: Mucous membranes are moist.      Pharynx: Oropharyngeal exudate and posterior oropharyngeal erythema present.   Eyes:      General:         Right eye: No discharge.         Left eye: No discharge.      Conjunctiva/sclera: Conjunctivae normal.   Cardiovascular:      Rate and Rhythm: Normal rate and regular rhythm.      Pulses: Normal pulses.      Heart sounds: Murmur heard.     No gallop.   Pulmonary:     "  Effort: Pulmonary effort is normal. No respiratory distress.      Breath sounds: No stridor. Rales (scattered through-out lobes, clears some with cough) present. No wheezing or rhonchi.   Chest:      Chest wall: No tenderness.   Abdominal:      General: Abdomen is flat. Bowel sounds are normal. There is no distension.      Palpations: Abdomen is soft.      Tenderness: There is no abdominal tenderness.   Musculoskeletal:         General: Normal range of motion.      Cervical back: Normal range of motion and neck supple. No rigidity or tenderness.   Lymphadenopathy:      Cervical: No cervical adenopathy.   Skin:     General: Skin is warm and dry.      Capillary Refill: Capillary refill takes 2 to 3 seconds.      Coloration: Skin is not jaundiced or pale.      Findings: Rash (generalized scalp psoriasis type plaques present with some localized erythema) present. No bruising, erythema or lesion.   Neurological:      Mental Status: He is alert. Mental status is at baseline.               Assessment/Plan   Diagnoses and all orders for this visit:    1. Upper respiratory tract infection, unspecified type (Primary)  -     guaifenesin (ROBITUSSIN) 100 MG/5ML liquid; Take 20 mL by mouth 3 (Three) Times a Day.  Dispense: 1000 mL; Refill: 0  -     azithromycin (Zithromax Z-Andrés) 250 MG tablet; Take 2 tablets the first day, then 1 tablet daily for 4 days.  Dispense: 6 tablet; Refill: 0  -     COVID-19,[facility] ROUTINE, NP/OP SWAB IN TRANSPORT MEDIA OR ESWAB 72 HR TAT - Swab, Anterior nasal  -     POC Influenza A / B  -     POC Rapid Strep A    2. Scalp psoriasis  -     Fluocinolone Acetonide Scalp 0.01 % oil; Apply 1 each topically Daily.  Dispense: 30 mL; Refill: 0               Plan of care reviewed with both Mr. Dc and is mother Carmelita.  Strep and flu negative. We did clean out cerumen impaction from bilateral ears, cerumen is very dry. Mother is using sweet oil prn to help with softening this.   Will treat with Z-pack, with  his comorbid conditions I worry about him developing pneumonia, there is some coarseness in lungs but it does clear when he coughs. I have asked Carmelita to please reach out if his condition worsens or does not improve within 48 hours of treatment initiation. I advised to continue to keep him well hydrated, this with the mucinex will help to thin secretions and continue to allow him to clear the congestion.   I have included instruction on how to use fluocinolone scalp oil for scalp psoriasis.   Follow-up at next scheduled appointment or prior to this as needed.

## 2022-05-07 LAB
LABCORP SARS-COV-2, NAA 2 DAY TAT: NORMAL
SARS-COV-2 RNA RESP QL NAA+PROBE: NOT DETECTED

## 2023-02-08 ENCOUNTER — OFFICE VISIT (OUTPATIENT)
Dept: INTERNAL MEDICINE | Facility: CLINIC | Age: 28
End: 2023-02-08
Payer: COMMERCIAL

## 2023-02-08 VITALS — BODY MASS INDEX: 23.92 KG/M2 | RESPIRATION RATE: 16 BRPM | TEMPERATURE: 97.7 F | HEIGHT: 62 IN | WEIGHT: 130 LBS

## 2023-02-08 DIAGNOSIS — J06.9 UPPER RESPIRATORY TRACT INFECTION, UNSPECIFIED TYPE: Primary | ICD-10-CM

## 2023-02-08 PROCEDURE — 99213 OFFICE O/P EST LOW 20 MIN: CPT

## 2023-02-08 RX ORDER — AZITHROMYCIN 500 MG/1
500 TABLET, FILM COATED ORAL DAILY
Qty: 5 TABLET | Refills: 0 | Status: SHIPPED | OUTPATIENT
Start: 2023-02-08 | End: 2023-02-13

## 2023-02-08 RX ORDER — GUAIFENESIN 200 MG/10ML
200 LIQUID ORAL 3 TIMES DAILY PRN
Qty: 118 ML | Refills: 0 | Status: SHIPPED | OUTPATIENT
Start: 2023-02-08

## 2023-02-09 NOTE — PROGRESS NOTES
Subjective     Chief Complaint:  Cough, runny nose    HPI:  Patient presents with his parents today with complaints of cough and runny nose that has been present for approximately 1 week.  His mother states that he has had a congested cough with yellow sputum production.  He has been receiving Delsym for his cough.  She reports that her grandson was at the house a little over a week ago and had a similar illness so she is concerned that he has the same thing.  Patient is unable to provide any information as he is nonverbal.    Patient was seen in May 2022 with similar symptoms.  At that time he was given a Z-Andrés and Robitussin which was effective.  Unfortunately, the patient is agitated today which would make strep, flu, or COVID testing a very difficult task and possible cause harm to the patient as he is jerking his head frequently.  Feel that he most likely has upper respiratory infection that needs to be treated with antibiotics given his risk for developing pneumonia with presence of comorbid conditions.    Past Medical History:   Past Medical History:   Diagnosis Date   • Allergic    • Blind    • Down syndrome    • Heart murmur    • Urinary tract infection    • Visual impairment      Past Surgical History:  Past Surgical History:   Procedure Laterality Date   • RETINAL DETACHMENT REPAIR Bilateral      multiple surgeries by Dr. Coelho       Allergies:  Allergies   Allergen Reactions   • Bacitracin Hives   • Penicillin G Hives     Medications:  Prior to Admission medications    Medication Sig Start Date End Date Taking? Authorizing Provider   Dextromethorphan-guaiFENesin (DELSYM COUGH/CHEST CONGEST DM PO) Take 10 mL by mouth 2 (Two) Times a Day.   Yes Provider, MD Ruth   Fluocinolone Acetonide Scalp 0.01 % oil Apply 1 each topically Daily. 5/6/22  Yes Fiona Hughes APRN   Linzess 72 MCG capsule capsule TAKE 1 CAPSULE BY MOUTH DAILY WITH BREAKFAST. 5/6/21  Yes Mackenzie Burgess APRN  "  prednisoLONE acetate (PRED FORTE) 1 % ophthalmic suspension 1 drop 4 (Four) Times a Day. 1 drop in each eye two times per week.   Yes Provider, MD Ruth   traZODone (DESYREL) 100 MG tablet Take 3 tablets by mouth Every Night. 3/16/22  Yes Mackenzie Burgess APRN   azithromycin (Zithromax) 500 MG tablet Take 1 tablet by mouth Daily for 5 days. 2/8/23 2/13/23  Radha Villa APRN   guaifenesin (ROBITUSSIN) 100 MG/5ML liquid Take 10 mL by mouth 3 (Three) Times a Day As Needed for Cough. 2/8/23   Radha Villa APRN       Objective     Vital Signs: Temp 97.7 °F (36.5 °C) (Infrared)   Resp 16   Ht 157.5 cm (62\")   Wt 59 kg (130 lb)   BMI 23.78 kg/m²      *Physical exam was very limited due to patient's agitation.*    Physical Exam  Vitals and nursing note reviewed.   Constitutional:       General: He is not in acute distress.     Appearance: Normal appearance.   HENT:      Head: Normocephalic.   Cardiovascular:      Rate and Rhythm: Normal rate.      Pulses: Normal pulses.      Heart sounds: Normal heart sounds.   Pulmonary:      Effort: Pulmonary effort is normal. No respiratory distress.      Breath sounds: Normal breath sounds. No wheezing.      Comments: Diminished in bilateral bases. Congested cough.  Abdominal:      General: Bowel sounds are normal. There is no distension.      Palpations: Abdomen is soft.      Tenderness: There is no abdominal tenderness.   Skin:     General: Skin is warm and dry.      Capillary Refill: Capillary refill takes less than 2 seconds.      Findings: No bruising, lesion or rash.   Neurological:      Mental Status: He is alert. Mental status is at baseline.      Comments: Agitated. Frequently jerking head back and forth due to agitation. Nonverbal at baseline.       Results Reviewed:  Reviewed note from office visit in May 2022.    Assessment / Plan     Assessment/Plan:  Diagnoses and all orders for this visit:    1. Upper respiratory tract infection, unspecified type " (Primary)  -     azithromycin (Zithromax) 500 MG tablet; Take 1 tablet by mouth Daily for 5 days.  Dispense: 5 tablet; Refill: 0  -     guaifenesin (ROBITUSSIN) 100 MG/5ML liquid; Take 10 mL by mouth 3 (Three) Times a Day As Needed for Cough.  Dispense: 118 mL; Refill: 0       Although physical exam was limited, feel that it would be appropriate to treat with antibiotic therapy since he has been sick for over a week and has yellow sputum production.  Will treat with Z-Andrés again since this did work well for him last time.  There are no concerns for pneumonia at this time.  Lungs are diminished in the bases but he does have a strong cough and is able to cough up sputum.  Recommend using Robitussin moving forward as well.  His parents have been educated to keep him well-hydrated.  Encouraged them to call in 2-3 days if he has not improved.  Will consider obtaining chest x-ray at that time.    They are requesting to follow up with PILI Hernandez for annual physical. The patient's father has seen her in office and was very complimentary of the care that she provided.    Return in about 3 months (around 5/8/2023) for with Joceline per family request. unless patient needs to be seen sooner or acute issues arise.    I have discussed the patient results/orders and and plan/recommendation with them at today's visit.      PILI Michel   02/08/2023

## 2023-02-20 ENCOUNTER — TELEPHONE (OUTPATIENT)
Dept: INTERNAL MEDICINE | Facility: CLINIC | Age: 28
End: 2023-02-20

## 2023-02-20 NOTE — TELEPHONE ENCOUNTER
Caller: Kwame Dc    Relationship to patient: Father    Best call back number:    [phone] (Mobile)     Patient is needing: Father called and stated that they are working on reassessment for medicaid waiver program.      Yadira the  w/ [facility] area development office was on the line with him as well. She stated that they are faxing over paperwork for his reassessment and she will include the return info and requested docs on the cover sheet; they are needing the following info:     [employer]  Form  Prescription for supplies (needs to be on standard prescription form w/ ICD and diagnosis codes )    Return ASAP prefer to have this back by the end of the week

## 2023-02-21 ENCOUNTER — TELEPHONE (OUTPATIENT)
Dept: INTERNAL MEDICINE | Facility: CLINIC | Age: 28
End: 2023-02-21
Payer: COMMERCIAL

## 2023-02-21 DIAGNOSIS — Q90.9 DOWN SYNDROME: Primary | ICD-10-CM

## 2023-02-21 DIAGNOSIS — R11.2 NAUSEA AND VOMITING, UNSPECIFIED VOMITING TYPE: Primary | ICD-10-CM

## 2023-02-21 DIAGNOSIS — R32 URINARY AND BOWEL INCONTINENCE: ICD-10-CM

## 2023-02-21 DIAGNOSIS — R15.9 URINARY AND BOWEL INCONTINENCE: ICD-10-CM

## 2023-02-21 RX ORDER — ONDANSETRON 4 MG/1
4 TABLET, ORALLY DISINTEGRATING ORAL EVERY 8 HOURS PRN
Qty: 20 TABLET | Refills: 0 | Status: SHIPPED | OUTPATIENT
Start: 2023-02-21

## 2023-02-21 NOTE — TELEPHONE ENCOUNTER
Please review and sign attached order for incontinent supplies thru the Medicaid Waiver Program.  Pt has Down Syndrome.

## 2023-02-21 NOTE — TELEPHONE ENCOUNTER
Caller: Mariaelena Puente    Relationship: Mother    Best call back number: [phone]    What medication are you requesting: DOCTORS SUGGESTION    What are your current symptoms: DIARRHEA VOMITING    How long have you been experiencing symptoms: THIS MORNING    Have you had these symptoms before:    [x] Yes  [] No    Have you been treated for these symptoms before:   [x] Yes  [] No    If a prescription is needed, what is your preferred pharmacy and phone number: [id number] - [place], [place] - [address] AT [address]OSS FROM [address]  [phone] [facility] [phone]

## 2023-02-21 NOTE — TELEPHONE ENCOUNTER
Spoke with patient's mother via telephone. She is concerned that he has a viral GI illness because she has started feeling poorly too. Will send in Zofran. Stressed the importance of adequate hydration with water and Pedialyte. She will need to call if symptoms do not improve over the next couple of days.

## 2023-02-28 DIAGNOSIS — G47.01 INSOMNIA DUE TO MEDICAL CONDITION: ICD-10-CM

## 2023-02-28 RX ORDER — TRAZODONE HYDROCHLORIDE 100 MG/1
300 TABLET ORAL NIGHTLY
Qty: 270 TABLET | Refills: 3 | Status: SHIPPED | OUTPATIENT
Start: 2023-02-28

## 2023-05-31 ENCOUNTER — OFFICE VISIT (OUTPATIENT)
Dept: INTERNAL MEDICINE | Facility: CLINIC | Age: 28
End: 2023-05-31

## 2023-05-31 VITALS — WEIGHT: 130 LBS | TEMPERATURE: 97.1 F | HEIGHT: 62 IN | BODY MASS INDEX: 23.92 KG/M2

## 2023-05-31 DIAGNOSIS — Z00.00 ANNUAL PHYSICAL EXAM: Primary | ICD-10-CM

## 2023-05-31 DIAGNOSIS — K59.09 CONSTIPATION, CHRONIC: ICD-10-CM

## 2023-05-31 DIAGNOSIS — K59.09 CHRONIC CONSTIPATION: ICD-10-CM

## 2023-05-31 DIAGNOSIS — Q24.9 ADULT CONGENITAL HEART DISEASE: ICD-10-CM

## 2023-05-31 DIAGNOSIS — G47.01 INSOMNIA DUE TO MEDICAL CONDITION: ICD-10-CM

## 2023-05-31 DIAGNOSIS — H54.8 LEGALLY BLIND: ICD-10-CM

## 2023-05-31 DIAGNOSIS — Q90.9 DOWN SYNDROME: ICD-10-CM

## 2023-05-31 DIAGNOSIS — H61.23 BILATERAL IMPACTED CERUMEN: ICD-10-CM

## 2023-05-31 RX ORDER — LINACLOTIDE 72 UG/1
72 CAPSULE, GELATIN COATED ORAL
Qty: 30 CAPSULE | Refills: 11 | Status: SHIPPED | OUTPATIENT
Start: 2023-05-31

## 2023-05-31 RX ORDER — TRAZODONE HYDROCHLORIDE 100 MG/1
300 TABLET ORAL NIGHTLY
Qty: 90 TABLET | Refills: 11 | Status: SHIPPED | OUTPATIENT
Start: 2023-05-31

## 2023-05-31 RX ORDER — MELATONIN 10 MG
CAPSULE ORAL
COMMUNITY

## 2023-05-31 RX ORDER — PREDNISOLONE ACETATE 10 MG/ML
1 SUSPENSION/ DROPS OPHTHALMIC 2 TIMES WEEKLY
Qty: 15 ML | Refills: 3 | Status: SHIPPED | OUTPATIENT
Start: 2023-06-01

## 2023-05-31 NOTE — PROGRESS NOTES
Procedure   Ear Cerumen Removal    Date/Time: 5/31/2023 3:44 PM  Performed by: Parris Lui APRN  Authorized by: Parris Lui APRN     Anesthesia:  Local Anesthetic: none  Location details: left ear and right ear  Patient tolerance: patient tolerated the procedure well with no immediate complications  Procedure type: irrigation

## 2023-05-31 NOTE — PROGRESS NOTES
"        Subjective     Chief Complaint:  Patient is non-verbal. Mother denies any concerns.    HPI:  The patient presents to the office today with his mother who is his primary caregiver.  The patient does have a history of Down syndrome and retinal detachment causing blindness.  He has a history of congenital heart disease and is followed by [facility] in [place].  The patient's mother indicates that he is doing \"as well as can be expected\".  She denies any acute concerns or complaints today.  She states that Linzess works well for his constipation.  He seems to have a good appetite, is not having any trouble or difficulty swallowing.    The patient's mother states that insomnia has been a longstanding issue for him, and he is taking both trazodone and melatonin nightly.     Patient's PMR from outside medical facility reviewed and noted.    Past Medical History:   Past Medical History:   Diagnosis Date    Allergic     Blind     Down syndrome     Heart murmur     Urinary tract infection     Visual impairment      Past Surgical History:  Past Surgical History:   Procedure Laterality Date    RETINAL DETACHMENT REPAIR Bilateral      multiple surgeries by Dr. Coelho     Social History:  reports that he has never smoked. He has never used smokeless tobacco. He reports that he does not drink alcohol and does not use drugs.    Family History: family history includes No Known Problems in his father, maternal grandfather, maternal grandmother, mother, paternal grandfather, and paternal grandmother.      Allergies:  Allergies   Allergen Reactions    Bacitracin Hives    Penicillin G Hives     Medications:  Prior to Admission medications    Medication Sig Start Date End Date Taking? Authorizing Provider   Linzess 72 MCG capsule capsule TAKE 1 CAPSULE BY MOUTH DAILY WITH BREAKFAST. 5/6/21  Yes Mackenzie Burgess APRN   Melatonin 10 MG capsule Take  by mouth.   Yes Provider, MD Ruth   ondansetron ODT " "(ZOFRAN-ODT) 4 MG disintegrating tablet Place 1 tablet on the tongue Every 8 (Eight) Hours As Needed for Nausea or Vomiting. 2/21/23  Yes Radha Villa APRN   prednisoLONE acetate (PRED FORTE) 1 % ophthalmic suspension 1 drop 4 (Four) Times a Day. 1 drop in each eye two times per week.   Yes ProviderRuth MD   traZODone (DESYREL) 100 MG tablet Take 3 tablets by mouth Every Night. 2/28/23  Yes Rigo Haney MD   Fluocinolone Acetonide Scalp 0.01 % oil Apply 1 each topically Daily. 5/6/22   Fiona Hughes APRN   Dextromethorphan-guaiFENesin (DELSYM COUGH/CHEST CONGEST DM PO) Take 10 mL by mouth 2 (Two) Times a Day.  5/31/23  ProviderRuth MD   guaifenesin (ROBITUSSIN) 100 MG/5ML liquid Take 10 mL by mouth 3 (Three) Times a Day As Needed for Cough.  Patient taking differently: Take 10 mg by mouth 3 (Three) Times a Day As Needed for Cough. 2/8/23 5/31/23  Radha Villa APRN       Objective     Vital Signs: Temp 97.1 °F (36.2 °C) (Temporal)   Ht 157.5 cm (62\")   Wt 59 kg (130 lb)   BMI 23.78 kg/m²   Physical Exam  Vitals and nursing note reviewed.   Constitutional:       Appearance: He is not ill-appearing or toxic-appearing.      Comments: Physical exam is limited as patient has to be held down by his mother   HENT:      Head: Normocephalic and atraumatic.      Right Ear: There is impacted cerumen.      Left Ear: There is impacted cerumen.   Eyes:      Comments: Opaque appearance to iris'-Legally blind   Cardiovascular:      Rate and Rhythm: Normal rate and regular rhythm.      Pulses: Normal pulses.      Heart sounds: Murmur heard.   Pulmonary:      Effort: Pulmonary effort is normal.      Breath sounds: No wheezing, rhonchi or rales.   Abdominal:      General: Bowel sounds are normal. There is no distension.      Palpations: Abdomen is soft.      Tenderness: There is no abdominal tenderness.   Musculoskeletal:         General: Deformity (contractures of lower extremities, feet " turn inward) present. No swelling or tenderness.      Cervical back: Normal range of motion and neck supple. No tenderness.   Skin:     General: Skin is warm and dry.      Findings: No erythema or rash.      Comments: Keloid scarring noted to the occiput   Neurological:      Mental Status: He is alert.       BMI is within normal parameters. No other follow-up for BMI required.    Results Reviewed:  Reviewed office visit note from last annual physical from March 2022.  Reviewed last office visit note with PILI Zuleta from 2/8/2023 in which the patient was treated for upper respiratory infection.  Last labs on file from March 2021-CMP showed low blood glucose of 54, normal TSH, low HDL of 31 and high triglycerides 196.  Unremarkable CBC.    Assessment / Plan     Assessment/Plan:  Diagnoses and all orders for this visit:    1. Annual physical exam (Primary)    2. Constipation, chronic    3. Down syndrome    4. Legally blind    5. Adult congenital heart disease    6. Bilateral impacted cerumen    7. Chronic constipation  -     Linzess 72 MCG capsule capsule; Take 1 capsule by mouth Daily With Breakfast.  Dispense: 30 capsule; Refill: 11    8. Insomnia due to medical condition  -     traZODone (DESYREL) 100 MG tablet; Take 3 tablets by mouth Every Night.  Dispense: 90 tablet; Refill: 11    Other orders  -     prednisoLONE acetate (PRED FORTE) 1 % ophthalmic suspension; Administer 1 drop to both eyes 2 (Two) Times a Week. 1 drop in each eye two times per week.  Dispense: 15 mL; Refill: 3       The patient presents to the office today for annual physical exam.  His mother indicates overall he seems to be doing fairly well given his condition.  He is followed by [facility] in [place] for congenital heart defect.  Refills will be sent for his medications-Linzess which he takes for chronic constipation.  His mother indicates that this is working well.  He does take trazodone and melatonin nightly  for insomnia, which his mother states has been a longstanding problem for him.  His current regimen seems to be working fairly well for him.    Irrigation was attempted of patient's ears for impacted cerumen.  All things considered, he did tolerate the procedure fairly well we were able to remove a large amount of cerumen from each ear.  His mother did have to assist in holding him.    Patient's mother declines any labs at this time, which I feel is very reasonable considering he does seem to be upset today and is having to be held down for procedures.    Return in about 1 year (around 5/31/2024), or if symptoms worsen or fail to improve, for Recheck, Annual physical. unless patient needs to be seen sooner or acute issues arise.    I have discussed the patient results/orders and and plan/recommendation with them at today's visit.      Parris Lui, APRN   05/31/2023

## 2023-09-01 DIAGNOSIS — K59.09 CHRONIC CONSTIPATION: ICD-10-CM

## 2023-09-01 RX ORDER — LINACLOTIDE 72 UG/1
72 CAPSULE, GELATIN COATED ORAL
Qty: 30 CAPSULE | Refills: 11 | Status: SHIPPED | OUTPATIENT
Start: 2023-09-01

## 2023-09-01 NOTE — TELEPHONE ENCOUNTER
Caller: MELVIN NGUYEN    Relationship: Mother    Best call back number: [phone]     Requested Prescriptions:   Requested Prescriptions     Pending Prescriptions Disp Refills    Linzess 72 MCG capsule capsule 30 capsule 11     Sig: Take 1 capsule by mouth Daily With Breakfast.        Pharmacy where request should be sent: [id number] - [place], [place] - [address] AT ACROSS FROM [facility] [phone] [facility] [phone]      Last office visit with prescribing clinician: Visit date not found   Last telemedicine visit with prescribing clinician: Visit date not found   Next office visit with prescribing clinician: 7/1/2024     Additional details provided by patient:     Does the patient have less than a 3 day supply:  [] Yes  [x] No    Would you like a call back once the refill request has been completed: [] Yes [] No    If the office needs to give you a call back, can they leave a voicemail: [] Yes [] No    Dank Minaya Rep   09/01/23 14:18 CDT

## 2023-11-08 DIAGNOSIS — G47.01 INSOMNIA DUE TO MEDICAL CONDITION: ICD-10-CM

## 2023-11-08 RX ORDER — TRAZODONE HYDROCHLORIDE 100 MG/1
300 TABLET ORAL NIGHTLY
Qty: 90 TABLET | Refills: 11 | OUTPATIENT
Start: 2023-11-08

## 2023-12-06 DIAGNOSIS — L40.9 SCALP PSORIASIS: ICD-10-CM

## 2023-12-06 RX ORDER — FLUOCINOLONE ACETONIDE 0.11 MG/ML
1 OIL TOPICAL DAILY
Qty: 30 ML | Refills: 0 | Status: SHIPPED | OUTPATIENT
Start: 2023-12-06

## 2023-12-06 NOTE — TELEPHONE ENCOUNTER
Caller: MELVIN    Relationship: Mother    Best call back number: [phone]     Requested Prescriptions:   Requested Prescriptions     Pending Prescriptions Disp Refills    Fluocinolone Acetonide Scalp 0.01 % oil 30 mL 0     Sig: Apply 1 each topically Daily.        Pharmacy where request should be sent: [id number] - [place], [place] - [address] AT [address]OSS FROM [facility] [phone] [facility] [phone]      Last office visit with prescribing clinician: Visit date not found   Last telemedicine visit with prescribing clinician: Visit date not found   Next office visit with prescribing clinician: 7/1/2024     Additional details provided by patient: COMPLETELY OUT    Does the patient have less than a 3 day supply:  [x] Yes  [] No    Would you like a call back once the refill request has been completed: [] Yes [] No    If the office needs to give you a call back, can they leave a voicemail: [] Yes [] No    Dank Minaya Rep   12/06/23 09:36 CST

## 2024-03-07 ENCOUNTER — TELEPHONE (OUTPATIENT)
Dept: INTERNAL MEDICINE | Facility: CLINIC | Age: 29
End: 2024-03-07

## 2024-03-07 NOTE — TELEPHONE ENCOUNTER
I have submitted this online today already, but don't expect we will get an answer before he runs out.  Is there something else she should do for him, while we are waiting on an answer, as far as maybe OTC regimen, etc.?

## 2024-03-07 NOTE — TELEPHONE ENCOUNTER
Caller: MELVIN NGUYEN    Relationship: Mother    Best call back number: [phone]     What test/procedure requested: PRIOR AUTHORIZATION LINZESS    When is it needed: ASAP    Where is the test/procedure going to be performed[facility]     Additional information or concerns: DOWN TO 1 TABLET

## 2024-03-07 NOTE — TELEPHONE ENCOUNTER
Mother informed that Dr. Haney is advising this OTC regimen, when he runs out of beneSol, until we receive a determination from insurance.

## 2024-07-02 DIAGNOSIS — K59.09 CHRONIC CONSTIPATION: ICD-10-CM

## 2024-07-03 RX ORDER — LINACLOTIDE 72 UG/1
72 CAPSULE, GELATIN COATED ORAL
Qty: 30 CAPSULE | Refills: 11 | OUTPATIENT
Start: 2024-07-03

## 2024-07-06 ENCOUNTER — NURSE TRIAGE (OUTPATIENT)
Dept: CALL CENTER | Facility: HOSPITAL | Age: 29
End: 2024-07-06
Payer: COMMERCIAL

## 2024-07-06 DIAGNOSIS — K59.09 CHRONIC CONSTIPATION: ICD-10-CM

## 2024-07-06 RX ORDER — LINACLOTIDE 72 UG/1
72 CAPSULE, GELATIN COATED ORAL
Qty: 30 CAPSULE | Refills: 0 | Status: SHIPPED | OUTPATIENT
Start: 2024-07-06

## 2024-07-06 NOTE — TELEPHONE ENCOUNTER
"The patient - needs a refill on Linzess- called on call provider authorized a 3 day supply [facility] ref[facility] [address]. Called back the computer will not allow a reorder. States he has refills until September. She will look into this. Secure chat - patient information. The Mom was updated.   Reason for Disposition  • [1] Follow-up call from patient regarding patient's clinical status AND [2] information urgent    Additional Information  • Negative: Lab calling with strep throat test results and triager can call in prescription  • Negative: Lab calling with urinalysis test results and triager can call in prescription  • Negative: Medication questions  • Negative: Medication renewal and refill questions  • Negative: Pre-operative or pre-procedural questions  • Negative: ED call to PCP (i.e., primary care provider; doctor, NP, or PA)  • Negative: Doctor (or NP/PA) call to PCP  • Negative: Call about patient who is currently hospitalized  • Negative: Lab or radiology calling with CRITICAL test results    Answer Assessment - Initial Assessment Questions  1. REASON FOR CALL or QUESTION: \"What is your reason for calling today?\" or \"How can I best  help you?\" or \"What question do you have that I can help answer?\"      Need medication.   2. CALLER: Document the source of call. (e.g., laboratory, patient).      Mom    Protocols used: PCP Call - No Triage-ADULT-    "

## 2024-07-08 RX ORDER — LINACLOTIDE 72 UG/1
72 CAPSULE, GELATIN COATED ORAL
Qty: 30 CAPSULE | Refills: 11 | OUTPATIENT
Start: 2024-07-08

## 2024-07-15 ENCOUNTER — OFFICE VISIT (OUTPATIENT)
Dept: INTERNAL MEDICINE | Facility: CLINIC | Age: 29
End: 2024-07-15
Payer: COMMERCIAL

## 2024-07-15 VITALS
OXYGEN SATURATION: 97 % | BODY MASS INDEX: 23.92 KG/M2 | SYSTOLIC BLOOD PRESSURE: 142 MMHG | DIASTOLIC BLOOD PRESSURE: 80 MMHG | HEART RATE: 82 BPM | TEMPERATURE: 97.6 F | HEIGHT: 62 IN | WEIGHT: 130 LBS

## 2024-07-15 DIAGNOSIS — Q21.23 ATRIOVENTRICULAR SEPTAL DEFECT (AVSD), COMPLETE: ICD-10-CM

## 2024-07-15 DIAGNOSIS — L21.9 SEBORRHEIC DERMATITIS OF SCALP: ICD-10-CM

## 2024-07-15 DIAGNOSIS — K59.09 CONSTIPATION, CHRONIC: ICD-10-CM

## 2024-07-15 DIAGNOSIS — K59.09 CHRONIC CONSTIPATION: ICD-10-CM

## 2024-07-15 DIAGNOSIS — H61.23 BILATERAL IMPACTED CERUMEN: ICD-10-CM

## 2024-07-15 DIAGNOSIS — Z00.00 WELLNESS EXAMINATION: Primary | ICD-10-CM

## 2024-07-15 DIAGNOSIS — R01.1 CARDIAC MURMUR: ICD-10-CM

## 2024-07-15 PROCEDURE — 99395 PREV VISIT EST AGE 18-39: CPT | Performed by: INTERNAL MEDICINE

## 2024-07-15 PROCEDURE — 69210 REMOVE IMPACTED EAR WAX UNI: CPT | Performed by: INTERNAL MEDICINE

## 2024-07-15 RX ORDER — LINACLOTIDE 72 UG/1
72 CAPSULE, GELATIN COATED ORAL
Qty: 30 CAPSULE | Refills: 0 | Status: SHIPPED | OUTPATIENT
Start: 2024-07-15

## 2024-07-15 RX ORDER — CICLOPIROX 80 MG/ML
SOLUTION TOPICAL NIGHTLY
Qty: 6 ML | Refills: 2 | Status: SHIPPED | OUTPATIENT
Start: 2024-07-15

## 2024-07-15 NOTE — PROGRESS NOTES
"      Chief Complaint  Annual Exam and discuss medicaiton (Scalp oil not working/Trazodone 100mg TID still has to give melatonin)    Subjective        Kwame Dc III presents to [facility]    HPI    Patient here for the above problems.  See Assessment and Plan for further HPI components.      Review of Systems    Objective   Vital Signs:  /80 (BP Location: Right arm, Patient Position: Sitting, Cuff Size: Adult)   Pulse 82   Temp 97.6 °F (36.4 °C) (Temporal)   Ht 157.5 cm (62\")   Wt 59 kg (130 lb)   SpO2 97%   BMI 23.78 kg/m²   Estimated body mass index is 23.78 kg/m² as calculated from the following:    Height as of this encounter: 157.5 cm (62\").    Weight as of this encounter: 59 kg (130 lb).      Physical Exam  Vitals and nursing note reviewed.   Constitutional:       Appearance: He is not ill-appearing.   Eyes:      General: No scleral icterus.     Conjunctiva/sclera: Conjunctivae normal.   Cardiovascular:      Heart sounds: Murmur heard.   Pulmonary:      Effort: Pulmonary effort is normal. No respiratory distress.   Skin:     Coloration: Skin is ashen.   Neurological:      Mental Status: He is alert. Mental status is at baseline.                     Assessment and Plan   Diagnoses and all orders for this visit:    1. Wellness examination (Primary)    2. Chronic constipation  -     Linzess 72 MCG capsule capsule; Take 1 capsule by mouth Daily With Breakfast.  Dispense: 30 capsule; Refill: 0    3. Seborrheic dermatitis of scalp    4. Constipation, chronic    5. Atrioventricular septal defect (AVSD), complete    6. Cardiac murmur    7. Bilateral impacted cerumen    Other orders  -     ciclopirox (PENLAC) 8 % solution; Apply  topically to the appropriate area as directed Every Night.  Dispense: 6 mL; Refill: 2  -     Ear Cerumen Removal        Recommend at least annual dental and vision screening.  Recommend annual influenza vaccination  Recommend a varied diet and " appropriate portion sizes.   CDC recommendations for physical activity:  At least 150 minutes a week (for example, 30 minutes a day, 5 days a week) of moderate-intensity activity such as brisk walking. Or can consider 75 minutes a week of vigorous-intensity activity such as hiking, jogging, or running.  At least 2 days a week of activities that strengthen muscles.  Plus activities to improve balance.  Patient unable to perform physical activity.  Patient incontinent and unable to ambulate.  Unable to perform exercise as above.    Health Maintenance Due   Topic Date Due    TDAP/TD VACCINES (2 - Td or Tdap) 07/17/2017     Ear Cerumen Removal    Date/Time: 7/15/2024 5:05 PM    Performed by: Rigo Haney MD  Authorized by: Rigo Haney MD  Location details: left ear and right ear  Patient tolerance: patient tolerated the procedure well with no immediate complications  Comments: Improved after cleaning out.  Patient had difficulty cooperating with procedure.    Procedure type: instrumentation, irrigation, curette       Trial of ciclopirox for scalp lesions.        Result Review :           BMI is within normal parameters. No other follow-up for BMI required.      BMI is within normal parameters. No other follow-up for BMI required.            Follow Up   Return in about 1 year (around 7/15/2025), or if symptoms worsen or fail to improve, for Annual physical - Labs prior to visit.  Patient was given instructions and counseling regarding his condition or for health maintenance advice. Please see specific information pulled into the AVS if appropriate.       CHELLE Haney MD, FACP, [facility]    Provided in AVS:  Preventive Care 21-39 Years Old, Male  Preventive care refers to lifestyle choices and visits with your health care provider that can promote health and wellness. Preventive care visits are also called wellness exams.  What can I expect for my preventive care visit?  Counseling  During your preventive  care visit, your health care provider may ask about your:  Medical history, including:  Past medical problems.  Family medical history.  Current health, including:  Emotional well-being.  Home life and relationship well-being.  Sexual activity.  Lifestyle, including:  Alcohol, nicotine or tobacco, and drug use.  Access to firearms.  Diet, exercise, and sleep habits.  Safety issues such as seatbelt and bike helmet use.  Sunscreen use.  Work and work environment.  Physical exam  Your health care provider may check your:  Height and weight. These may be used to calculate your BMI (body mass index). BMI is a measurement that tells if you are at a healthy weight.  Waist circumference. This measures the distance around your waistline. This measurement also tells if you are at a healthy weight and may help predict your risk of certain diseases, such as type 2 diabetes and high blood pressure.  Heart rate and blood pressure.  Body temperature.  Skin for abnormal spots.  What immunizations do I need?    Vaccines are usually given at various ages, according to a schedule. Your health care provider will recommend vaccines for you based on your age, medical history, and lifestyle or other factors, such as travel or where you work.  What tests do I need?  Screening  Your health care provider may recommend screening tests for certain conditions. This may include:  Lipid and cholesterol levels.  Diabetes screening. This is done by checking your blood sugar (glucose) after you have not eaten for a while (fasting).  Hepatitis B test.  Hepatitis C test.  HIV (human immunodeficiency virus) test.  STI (sexually transmitted infection) testing, if you are at risk.  Talk with your health care provider about your test results, treatment options, and if necessary, the need for more tests.  Follow these instructions at home:  Eating and drinking    Eat a healthy diet that includes fresh fruits and vegetables, whole grains, lean protein, and  low-fat dairy products.  Drink enough fluid to keep your urine pale yellow.  Take vitamin and mineral supplements as recommended by your health care provider.  Do not drink alcohol if your health care provider tells you not to drink.  If you drink alcohol:  Limit how much you have to 0-2 drinks a day.  Know how much alcohol is in your drink. In the [place] one drink equals one 12 oz bottle of beer (355 mL), one 5 oz glass of wine (148 mL), or one 1½ oz glass of hard liquor (44 mL).  Lifestyle  Brush your teeth every morning and night with fluoride toothpaste. Floss one time each day.  Exercise for at least 30 minutes 5 or more days each week.  Do not use any products that contain nicotine or tobacco. These products include cigarettes, chewing tobacco, and vaping devices, such as e-cigarettes. If you need help quitting, ask your health care provider.  Do not use drugs.  If you are sexually active, practice safe sex. Use a condom or other form of protection to prevent STIs.  Find healthy ways to manage stress, such as:  Meditation, yoga, or listening to music.  Journaling.  Talking to a trusted person.  Spending time with friends and family.  Minimize exposure to UV radiation to reduce your risk of skin cancer.  Safety  Always wear your seat belt while driving or riding in a vehicle.  Do not drive:  If you have been drinking alcohol. Do not ride with someone who has been drinking.  If you have been using any mind-altering substances or drugs.  While texting.  When you are tired or distracted.  Wear a helmet and other protective equipment during sports activities.  If you have firearms in your house, make sure you follow all gun safety procedures.  Seek help if you have been physically or sexually abused.  What's next?  Go to your health care provider once a year for an annual wellness visit.  Ask your health care provider how often you should have your eyes and teeth checked.  Stay up to date on all vaccines.  This  information is not intended to replace advice given to you by your health care provider. Make sure you discuss any questions you have with your health care provider.  Document Revised: 06/15/2022 Document Reviewed: 06/15/2022  [employer] Patient Education © 2024 [employer]       Electronically signed by Rigo Haney MD, 07/15/24, 5:03 PM CDT.

## 2024-07-22 ENCOUNTER — TELEPHONE (OUTPATIENT)
Dept: INTERNAL MEDICINE | Facility: CLINIC | Age: 29
End: 2024-07-22
Payer: COMMERCIAL

## 2024-07-22 RX ORDER — PREDNISOLONE ACETATE 10 MG/ML
1 SUSPENSION/ DROPS OPHTHALMIC 2 TIMES WEEKLY
Qty: 15 ML | Refills: 3 | Status: SHIPPED | OUTPATIENT
Start: 2024-07-22

## 2024-07-22 NOTE — TELEPHONE ENCOUNTER
Caller: MELVIN NGUYEN    Relationship: Mother    Best call back number: [phone]     What medication are you requesting: CLOTRIMAZOLE 1%   FLUCINONIDE 0.05%    What are your current symptoms: SCALP CONDITION    How long have you been experiencing symptoms: A WHILE     Have you had these symptoms before:    [x] Yes  [] No    Have you been treated for these symptoms before:   [x] Yes  [] No    If a prescription is needed, what is your preferred pharmacy and phone number: [id number] - [place] - [address] AT [address]OSS FROM [address] - [phone] [facility] [phone] FX     Additional notes: MOM SAID PATIENT HAS A SCALP CONDITION, HER GRANDSON ALSO HAS IT AND IS USING THESE TWO MEDICATIONS TOGETHER AND IT IS REALLY HELPING

## 2024-07-22 NOTE — TELEPHONE ENCOUNTER
Mother called back - she states the Clotrimazole is a cream and the Fluocinolone is a liquid.  He has seborrheic dermatitis of the scalp.  Wasn't sure which formulation or choices to pend on the Rx's, if even appropriate for his condition.    She states he needs his Prednisone eye drops refilled.  It looks like Joceline has refilled this in the past, so have pended to this message, if appropriate.

## 2024-07-24 ENCOUNTER — TELEPHONE (OUTPATIENT)
Dept: INTERNAL MEDICINE | Facility: CLINIC | Age: 29
End: 2024-07-24

## 2024-07-24 DIAGNOSIS — L40.9 SCALP PSORIASIS: ICD-10-CM

## 2024-07-24 NOTE — TELEPHONE ENCOUNTER
Mother informed that covering provider did not want to change his regimen, but rather let Dr. Haney review upon his return.  She voiced understanding.

## 2024-07-24 NOTE — TELEPHONE ENCOUNTER
Caller: MELVIN    Relationship: Mother    Best call back number: [phone]     What is the best time to reach you: ANY     Who are you requesting to speak with (clinical staff, provider,  specific staff member): NONE SPECIFIED     What was the call regarding:     PATIENT'S MOTHER WOULD LIKE TO CHECK ON THE STATUS OF HER REQUEST FOR CLOTRIMAZOLE AND FLUCINONIDE. PATIENT'S MOTHER STATES THE PHARMACY ONLY RECEIVED PATIENT'S PREDNISONE PRESCRIPTION.     Is it okay if the provider responds through MyChart: PLEASE CALL

## 2024-07-25 RX ORDER — CLOTRIMAZOLE 1 %
1 CREAM (GRAM) TOPICAL 2 TIMES DAILY
Qty: 60 G | Refills: 2 | Status: SHIPPED | OUTPATIENT
Start: 2024-07-25

## 2024-07-25 RX ORDER — FLUOCINOLONE ACETONIDE 0.11 MG/ML
OIL TOPICAL
Qty: 118.28 ML | Refills: 1 | Status: SHIPPED | OUTPATIENT
Start: 2024-07-25

## 2024-07-25 NOTE — TELEPHONE ENCOUNTER
"Mother had called asking for the Fluocinolone \"liquid\" and Clotrimazole cream for his seborrheic dermatitis, as a family member has used this combo and it worked well.  Looks like he has had the Fluocinolone previously, but not the Clotrimazole.  She was informed that this would have to wait until you returned, as Dr. Tyson did not wish to prescribe.  "

## 2024-08-28 DIAGNOSIS — G47.01 INSOMNIA DUE TO MEDICAL CONDITION: ICD-10-CM

## 2024-08-28 RX ORDER — TRAZODONE HYDROCHLORIDE 100 MG/1
300 TABLET ORAL NIGHTLY
Qty: 90 TABLET | Refills: 11 | Status: SHIPPED | OUTPATIENT
Start: 2024-08-28

## 2024-08-28 NOTE — TELEPHONE ENCOUNTER
Caller: NGUYENMELVIN     Relationship: MOTHER     Best call back number: [phone]    Requested Prescriptions:     traZODone (DESYREL) 100 MG tablet  300 mg, Nightly          Pharmacy where request should be sent:  [facility]/pharmacy #[id number] - [place], [place] - [address] AT ACROSS FROM [address] - [phone]  [phone]     Last office visit with prescribing clinician: 7/15/2024   Last telemedicine visit with prescribing clinician: Visit date not found   Next office visit with prescribing clinician: Visit date not found     Additional details provided by patient: SHE STATES SHE WAS GETTING RID OF OLD MEDS AND SHE THINKS SHE HAS THROWN HIS MEDICATION AWAY SHE IS ASKING FOR A REFILL     Does the patient have less than a 3 day supply:  [x] Yes  [] No    Would you like a call back once the refill request has been completed: [x] Yes [] No    If the office needs to give you a call back, can they leave a voicemail: [x] Yes [] No    Dank Clark Rep   08/28/24 11:45 CDT

## 2024-09-05 DIAGNOSIS — K59.09 CHRONIC CONSTIPATION: ICD-10-CM

## 2024-09-05 RX ORDER — LINACLOTIDE 72 UG/1
72 CAPSULE, GELATIN COATED ORAL
Qty: 90 CAPSULE | Refills: 3 | Status: SHIPPED | OUTPATIENT
Start: 2024-09-05

## 2024-09-05 RX ORDER — LINACLOTIDE 72 UG/1
72 CAPSULE, GELATIN COATED ORAL
Qty: 30 CAPSULE | Refills: 0 | OUTPATIENT
Start: 2024-09-05

## 2024-09-05 NOTE — TELEPHONE ENCOUNTER
Caller: SAPNA MOTHER    Relationship: MOTHER    Best call back number: [phone]      Requested Prescriptions     Pending Prescriptions Disp Refills    Linzess 72 MCG capsule capsule 30 capsule 0     Sig: Take 1 capsule by mouth Daily With Breakfast.        Pharmacy where request should be sent: [facility]/PHARMACY #[id number] - [place], [place] - [address] AT ACROSS FROM [facility] [phone] [facility] [phone]      Last office visit with prescribing clinician: 7/15/2024   Last telemedicine visit with prescribing clinician: Visit date not found   Next office visit with prescribing clinician: Visit date not found     Additional details provided by patient:     TOOK THE LAST ONE TODAY    Does the patient have less than a 3 day supply:  [x] Yes  [] No    Would you like a call back once the refill request has been completed: [] Yes [] No    If the office needs to give you a call back, can they leave a voicemail: [] Yes [] No    Dank Jefferson Rep   09/05/24 11:51 CDT

## 2024-09-15 ENCOUNTER — NURSE TRIAGE (OUTPATIENT)
Dept: CALL CENTER | Facility: HOSPITAL | Age: 29
End: 2024-09-15
Payer: COMMERCIAL

## 2024-09-16 NOTE — TELEPHONE ENCOUNTER
"  Reason for Disposition   [1] DOUBLE DOSE (an extra dose or lesser amount) of prescription drug AND [2] any symptoms (e.g., dizziness, nausea, pain, sleepiness)    Additional Information   Negative: [1] Intentional drug overdose AND [2] suicidal thoughts or ideas   Negative: Drug overdose and triager unable to answer question   Negative: Caller requesting a renewal or refill of a medicine patient is currently taking   Negative: Caller requesting information unrelated to medicine   Negative: Caller requesting information about COVID-19 Vaccine   Negative: Caller requesting information about Emergency Contraception   Negative: Caller requesting information about Combined Birth Control Pills   Negative: Caller requesting information about Progestin Birth Control Pills   Negative: Caller requesting information about Post-Op pain or medicines   Negative: Caller requesting a prescription antibiotic (such as Penicillin) for Strep throat and has a positive culture result   Negative: Caller requesting a prescription anti-viral med (such as Tamiflu) and has influenza (flu) symptoms   Negative: Immunization reaction suspected   Negative: Rash while taking a medicine or within 3 days of stopping it   Negative: [1] Asthma and [2] having symptoms of asthma (cough, wheezing, etc.)   Negative: [1] Symptom of illness (e.g., headache, abdominal pain, earache, vomiting) AND [2] more than mild   Negative: Breastfeeding questions about mother's medicines and diet   Negative: MORE THAN A DOUBLE DOSE of a prescription or over-the-counter (OTC) drug    Answer Assessment - Initial Assessment Questions  1. NAME of MEDICINE: \"What medicine(s) are you calling about?\"      Trazodone and melatonin  2. QUESTION: \"What is your question?\" (e.g., double dose of medicine, side effect)      Accidentally gave meds twice  3. PRESCRIBER: \"Who prescribed the medicine?\" Reason: if prescribed by specialist, call should be referred to that group.      -  4. " "SYMPTOMS: \"Do you have any symptoms?\" If Yes, ask: \"What symptoms are you having?\"  \"How bad are the symptoms (e.g., mild, moderate, severe)      Did not report at this time  5. PREGNANCY:  \"Is there any chance that you are pregnant?\" \"When was your last menstrual period?\"      N/a    Protocols used: Medication Question Call-ADULT-    "

## 2025-03-26 DIAGNOSIS — G47.01 INSOMNIA DUE TO MEDICAL CONDITION: ICD-10-CM

## 2025-03-26 NOTE — TELEPHONE ENCOUNTER
Caller: MELVIN    Relationship: Mother    Best call back number: [phone]     Requested Prescriptions:   Requested Prescriptions     Pending Prescriptions Disp Refills    traZODone (DESYREL) 100 MG tablet 90 tablet 11     Sig: Take 3 tablets by mouth Every Night.        Pharmacy where request should be sent: [facility]/PHARMACY #[id number] - [place], [place] - [address] AT ACROSS FROM [facility] [phone] [facility] [phone]      Last office visit with prescribing clinician: 7/15/2024   Last telemedicine visit with prescribing clinician: Visit date not found   Next office visit with prescribing clinician: 7/17/2025     Additional details provided by patient:     Does the patient have less than a 3 day supply:  [x] Yes  [] No    Would you like a call back once the refill request has been completed: [] Yes [] No    If the office needs to give you a call back, can they leave a voicemail: [] Yes [] No    Dank Samano Rep   03/26/25 15:19 CDT

## 2025-03-27 RX ORDER — TRAZODONE HYDROCHLORIDE 100 MG/1
300 TABLET ORAL NIGHTLY
Qty: 90 TABLET | Refills: 5 | Status: SHIPPED | OUTPATIENT
Start: 2025-03-27

## 2025-04-23 DIAGNOSIS — G47.01 INSOMNIA DUE TO MEDICAL CONDITION: ICD-10-CM

## 2025-04-23 RX ORDER — TRAZODONE HYDROCHLORIDE 100 MG/1
300 TABLET ORAL NIGHTLY
Qty: 90 TABLET | Refills: 2 | Status: SHIPPED | OUTPATIENT
Start: 2025-04-23

## 2025-04-29 ENCOUNTER — TELEPHONE (OUTPATIENT)
Dept: INTERNAL MEDICINE | Facility: CLINIC | Age: 30
End: 2025-04-29

## 2025-04-29 NOTE — TELEPHONE ENCOUNTER
Caller: Kwame Dc III    Relationship to patient: Self    Best call back number:     [phone] (       PT IS JUST WONDERING WHEN LAST TB SKIN TEST WAS. PLEASE CALL TO ADVISE.

## 2025-05-07 DIAGNOSIS — K59.09 CHRONIC CONSTIPATION: ICD-10-CM

## 2025-05-07 RX ORDER — LINACLOTIDE 72 UG/1
72 CAPSULE, GELATIN COATED ORAL
Qty: 90 CAPSULE | Refills: 3 | Status: SHIPPED | OUTPATIENT
Start: 2025-05-07

## 2025-05-07 NOTE — TELEPHONE ENCOUNTER
Caller: MELVIN NGUYEN    Relationship: Mother    Best call back number: [phone]    Requested Prescriptions:   Requested Prescriptions     Pending Prescriptions Disp Refills    Linzess 72 MCG capsule capsule 90 capsule 3     Sig: Take 1 capsule by mouth Daily With Breakfast.        Pharmacy where request should be sent: [facility]/PHARMACY #[id number] - [place], [place] - [address] AT ACROSS FROM [facility] [phone] [facility] [phone]      Last office visit with prescribing clinician: 7/15/2024   Last telemedicine visit with prescribing clinician: Visit date not found   Next office visit with prescribing clinician: 7/22/2025         Does the patient have less than a 3 day supply:  [x] Yes  [] No    Would you like a call back once the refill request has been completed: [] Yes [x] No        Dank Nazario Rep   05/07/25 12:30 CDT

## 2025-05-16 ENCOUNTER — TELEPHONE (OUTPATIENT)
Dept: INTERNAL MEDICINE | Facility: CLINIC | Age: 30
End: 2025-05-16

## 2025-05-16 NOTE — TELEPHONE ENCOUNTER
Caller: DEE NGUYEN    Relationship to patient:     Best call back number:     [phone]       Patient is needing: SISTER WANTS FMLA PAPERS FAXED TO HER. DOES SHE NEED TO SEND PAPERWORK PROVING HE IS A GUARDIAN?     FAX [phone]

## 2025-05-20 NOTE — TELEPHONE ENCOUNTER
Called Maryellen Dc and I asked her to provide FMLA forms to our office. Advised her of $20 FMLA fee due to complete forms. She said she is secondary guardian to her parents and she would only need off if her parents were not available (vacation, hospitalized, etc). Maryellen will bring FMLA paperwork by office as soon as she get a chance.

## 2025-06-17 ENCOUNTER — TELEPHONE (OUTPATIENT)
Dept: INTERNAL MEDICINE | Facility: CLINIC | Age: 30
End: 2025-06-17

## 2025-06-17 NOTE — TELEPHONE ENCOUNTER
He will have to be seen, to document the need for this equipment, so insurance will consider coverage.  He has not been seen since 7/2024.  Please contact mother to schedule - ok to schedule with APRN if necessary.

## 2025-06-17 NOTE — TELEPHONE ENCOUNTER
Caller: MELVIN NGUYEN    Relationship: Mother    Best call back number: [phone]     What is the best time to reach you: ANYTIME    Who are you requesting to speak with (clinical staff, provider,  specific staff member): CLINICAL    What was the call regarding: MOTHER IS REQUESTING FOR A OSBALDO LIFT TO BE ORDERED    Is it okay if the provider responds through MyChart: NO

## 2025-06-24 NOTE — TELEPHONE ENCOUNTER
Caller: MELVIN NGUYEN    Relationship: Mother    Best call back number: [phone]     Who are you requesting to speak with (clinical staff, provider,  specific staff member):     What was the call regarding: I OFFERED THE PATIENT'S MOTHER A SOONER APPOINTMENT OPTION WITH [facility], BUT SHE PREFERRED TO KEEP THE PHYSICAL APPOINTMENT ON 7/22/25 WITH DR. CHRISTOPHER.

## 2025-07-22 ENCOUNTER — OFFICE VISIT (OUTPATIENT)
Dept: INTERNAL MEDICINE | Facility: CLINIC | Age: 30
End: 2025-07-22
Payer: COMMERCIAL

## 2025-07-22 VITALS
SYSTOLIC BLOOD PRESSURE: 140 MMHG | DIASTOLIC BLOOD PRESSURE: 80 MMHG | HEIGHT: 60 IN | OXYGEN SATURATION: 97 % | WEIGHT: 135 LBS | HEART RATE: 87 BPM | TEMPERATURE: 97.4 F | BODY MASS INDEX: 26.5 KG/M2

## 2025-07-22 DIAGNOSIS — Z99.3 WHEELCHAIR DEPENDENCE: ICD-10-CM

## 2025-07-22 DIAGNOSIS — M41.115 JUVENILE IDIOPATHIC SCOLIOSIS OF THORACOLUMBAR REGION: ICD-10-CM

## 2025-07-22 DIAGNOSIS — Q90.9 DOWN SYNDROME: ICD-10-CM

## 2025-07-22 DIAGNOSIS — Z00.00 WELLNESS EXAMINATION: Primary | ICD-10-CM

## 2025-07-22 DIAGNOSIS — R26.2 UNABLE TO AMBULATE: ICD-10-CM

## 2025-07-22 DIAGNOSIS — H54.8 LEGALLY BLIND: ICD-10-CM

## 2025-07-22 DIAGNOSIS — F51.04 CHRONIC INSOMNIA: ICD-10-CM

## 2025-07-22 DIAGNOSIS — L21.9 SEBORRHEIC DERMATITIS OF SCALP: ICD-10-CM

## 2025-07-22 PROCEDURE — 99395 PREV VISIT EST AGE 18-39: CPT | Performed by: INTERNAL MEDICINE

## 2025-07-22 NOTE — PROGRESS NOTES
"      Chief Complaint  loni lift (As it is harder to get pt around/), Annual Exam, discuss medication (Melatonin does not seem to be working any more ), and check ears     Subjective        Kwame Dc III presents to [facility]    HPI    Patient here for the above problems.  See Assessment and Plan for further HPI components.      Review of Systems    Objective   Vital Signs:  /80 (BP Location: Left arm, Patient Position: Sitting, Cuff Size: Adult)   Pulse 87   Temp 97.4 °F (36.3 °C) (Temporal)   Ht 152.4 cm (60\")   Wt 61.2 kg (135 lb) Comment: stated  SpO2 97%   BMI 26.37 kg/m²   Estimated body mass index is 26.37 kg/m² as calculated from the following:    Height as of this encounter: 152.4 cm (60\").    Weight as of this encounter: 61.2 kg (135 lb).      Physical Exam  Vitals and nursing note reviewed.   Constitutional:       Appearance: He is not ill-appearing.   HENT:      Head:     Eyes:      General: No scleral icterus.     Conjunctiva/sclera: Conjunctivae normal.   Cardiovascular:      Rate and Rhythm: Normal rate and regular rhythm.      Heart sounds: Murmur heard.   Pulmonary:      Effort: Pulmonary effort is normal. No respiratory distress.   Neurological:      General: No focal deficit present.      Mental Status: He is alert and oriented to person, place, and time.   Psychiatric:         Mood and Affect: Mood normal.         Behavior: Behavior normal.                     Assessment and Plan   Diagnoses and all orders for this visit:    1. Wellness examination (Primary)    2. Down syndrome    3. Juvenile idiopathic scoliosis of thoracolumbar region    4. Legally blind    5. Seborrheic dermatitis of scalp    6. Chronic insomnia    7. Unable to ambulate    8. Wheelchair dependence      Recommend at least annual dental screening.  Recommend annual influenza vaccination  Recommend a varied diet and appropriate portion sizes.   CDC recommendations for physical " activity:  At least 150 minutes a week (for example, 30 minutes a day, 5 days a week) of moderate-intensity activity such as brisk walking. Or can consider 75 minutes a week of vigorous-intensity activity such as hiking, jogging, or running.  At least 2 days a week of activities that strengthen muscles.  Plus activities to improve balance.  Patient lacks ability to routinely exercise due to his conditions.  His upper body strength appears to be very strong.     Health Maintenance Due   Topic Date Due    TDAP/TD VACCINES (2 - Td or Tdap) 07/17/2017     Recommend TDAP.    Labs to be done soon.    The patient requires assistance for transfers.  Patient is in a wheel chair and cannot assist with transfers.  Patient needs assistance to and from bed, wheelchair, and bathroom.  Without the lift patient would be limited and confined.  The patient has down syndrome, idiopathic scoliosis, legally blind, and patient lacks the ability to stand or ambulate at all.  As the patient's parents are aging, there is also risk of injury to the caregivers without the assistance of a lift.  We will send the order for a loni lift as I think it is medically necessary for the patient.      Recommend trial of Sleep 3 instead of plain melatonin.    Recommend debrox for ears.  They are not bad enough to need to be cleaned out today.    Patient has a murmur.  Known congential disease.    BP a little elevated today but he is a little more worked up today.        Result Review :  The following data was reviewed by: Rigo Haney MD on 07/22/2025:                     BMI is >= 25 and <30. (Overweight) The following options were offered after discussion;: none (medical contraindication)                  Follow Up   Return in about 1 year (around 7/22/2026), or if symptoms worsen or fail to improve, for follow up for above problems. Longitudinal care., Annual physical - Labs prior to visit.  Patient was given instructions and counseling  regarding his condition or for health maintenance advice. Please see specific information pulled into the AVS if appropriate.       CHELLE Haney MD, FACP, FHM      Electronically signed by Rigo Hnaey MD, 07/22/25, 2:35 PM CDT.